# Patient Record
Sex: MALE | Race: WHITE | NOT HISPANIC OR LATINO | Employment: OTHER | URBAN - METROPOLITAN AREA
[De-identification: names, ages, dates, MRNs, and addresses within clinical notes are randomized per-mention and may not be internally consistent; named-entity substitution may affect disease eponyms.]

---

## 2017-01-20 ENCOUNTER — OFFICE VISIT (OUTPATIENT)
Dept: MEDICAL GROUP | Age: 52
End: 2017-01-20
Payer: COMMERCIAL

## 2017-01-20 VITALS
DIASTOLIC BLOOD PRESSURE: 76 MMHG | SYSTOLIC BLOOD PRESSURE: 122 MMHG | TEMPERATURE: 97.5 F | HEIGHT: 72 IN | OXYGEN SATURATION: 97 % | WEIGHT: 187 LBS | BODY MASS INDEX: 25.33 KG/M2 | HEART RATE: 103 BPM | RESPIRATION RATE: 16 BRPM

## 2017-01-20 DIAGNOSIS — R20.0 NUMBNESS AND TINGLING OF LEFT LEG: ICD-10-CM

## 2017-01-20 DIAGNOSIS — F41.9 ANXIETY: ICD-10-CM

## 2017-01-20 DIAGNOSIS — R73.01 IMPAIRED FASTING GLUCOSE: ICD-10-CM

## 2017-01-20 DIAGNOSIS — E78.2 MIXED HYPERLIPIDEMIA: ICD-10-CM

## 2017-01-20 DIAGNOSIS — F32.A ANXIETY AND DEPRESSION: ICD-10-CM

## 2017-01-20 DIAGNOSIS — F32.9 REACTIVE DEPRESSION (SITUATIONAL): ICD-10-CM

## 2017-01-20 DIAGNOSIS — M54.42 ACUTE MIDLINE LOW BACK PAIN WITH LEFT-SIDED SCIATICA: ICD-10-CM

## 2017-01-20 DIAGNOSIS — F41.9 ANXIETY AND DEPRESSION: ICD-10-CM

## 2017-01-20 DIAGNOSIS — R20.2 NUMBNESS AND TINGLING OF LEFT LEG: ICD-10-CM

## 2017-01-20 DIAGNOSIS — F51.01 PRIMARY INSOMNIA: ICD-10-CM

## 2017-01-20 DIAGNOSIS — R73.01 IFG (IMPAIRED FASTING GLUCOSE): ICD-10-CM

## 2017-01-20 PROCEDURE — 99214 OFFICE O/P EST MOD 30 MIN: CPT | Performed by: INTERNAL MEDICINE

## 2017-01-20 RX ORDER — IBUPROFEN 800 MG/1
800 TABLET ORAL EVERY 8 HOURS PRN
COMMUNITY
End: 2017-01-20 | Stop reason: SDUPTHER

## 2017-01-20 RX ORDER — ALPRAZOLAM 0.25 MG/1
0.25 TABLET ORAL 2 TIMES DAILY PRN
Qty: 60 TAB | Refills: 5 | Status: SHIPPED | OUTPATIENT
Start: 2017-01-20 | End: 2017-08-31 | Stop reason: SDUPTHER

## 2017-01-20 RX ORDER — METHYLPREDNISOLONE 4 MG/1
TABLET ORAL
Qty: 1 KIT | Refills: 1 | Status: SHIPPED | OUTPATIENT
Start: 2017-01-20 | End: 2017-04-21

## 2017-01-20 RX ORDER — IBUPROFEN 800 MG/1
800 TABLET ORAL EVERY 8 HOURS PRN
Qty: 90 TAB | Refills: 1 | Status: SHIPPED | OUTPATIENT
Start: 2017-01-26 | End: 2019-03-29

## 2017-01-20 ASSESSMENT — ENCOUNTER SYMPTOMS
NUMBNESS: 1
EYES NEGATIVE: 1
GASTROINTESTINAL NEGATIVE: 1
WEAKNESS: 0
BOWEL INCONTINENCE: 0
CONSTITUTIONAL NEGATIVE: 1
WEIGHT LOSS: 0
HEADACHES: 0
PERIANAL NUMBNESS: 0
PSYCHIATRIC NEGATIVE: 1
FEVER: 0
CARDIOVASCULAR NEGATIVE: 1
PARESIS: 0
LEG PAIN: 1
TINGLING: 1
BACK PAIN: 1
ABDOMINAL PAIN: 0
RESPIRATORY NEGATIVE: 1
PARESTHESIAS: 1

## 2017-01-20 NOTE — MR AVS SNAPSHOT
"        Joseph Gray   2017 11:20 AM   Office Visit   MRN: [id number]    Department:  [address] [place] [address]   Dept Phone:  [phone]    Description:  Male : 1965   Provider:  Jose Luis Mace M.D.           Reason for Visit     Back Pain UC f/v [facility] poss displaced disk in lower back, difficulty walking      Allergies as of 2017     No Known Allergies      You were diagnosed with     Acute midline low back pain with left-sided sciatica   [7679873]       Numbness and tingling of left leg   [847534]       Mixed hyperlipidemia   [272.2.ICD-9-CM]       Impaired fasting glucose   [790.21.ICD-9-CM]       Anxiety   [321148]       Primary insomnia   [724655]       Reactive depression (situational)   [083902]       Anxiety and depression   [091562]   Chronic and well controlled with Xanax. Refill done. Follow-up with PCP.    IFG (impaired fasting glucose)   [350839]         Vital Signs     Blood Pressure Pulse Temperature Respirations Height Weight    122/76 mmHg 103 36.4 °C (97.5 °F) 16 1.829 m (6' 0.01\") 84.823 kg (187 lb)    Body Mass Index Oxygen Saturation Smoking Status             25.36 kg/m2 97% Never Smoker          Basic Information     Date Of Birth Sex Race Ethnicity Preferred Language    1965 Male White Non- English      Your appointments     2017  2:40 PM   ANNUAL EXAM PREVENTATIVE with Jose Luis Mace M.D.   [address] [facility]     [place] [id number] [phone]            2017 10:00 AM   ANNUAL EXAM PREVENTATIVE with Jose Luis Mace M.D.   [facility] [address] [employer] [facility])    [address] [place] [id number] [phone]              Problem List              ICD-10-CM Priority Class Noted - Resolved    Anxiety F41.9   2013 - Present    Impaired fasting glucose R73.01   2013 - Present    Vitamin D deficiency disease E55.9   2013 - Present    HLD " (hyperlipidemia)- no meds E78.5   12/4/2013 - Present    Depression- grief reacion; lost wife 2013, prolonged F32.9   12/4/2013 - Present    Eczema L30.9   3/19/2015 - Present    Intracranial hypotension- idiopathic, intermittent, orthostatic; no rx except bedrest; no blood patch ever done G93.89   3/19/2015 - Present    Sebaceous cyst of right axilla L72.3   2/10/2016 - Present    Reactive depression (situational)- prolonged, lost wife 2013 F32.9   1/20/2017 - Present    Primary insomnia F51.01   1/20/2017 - Present    IFG (impaired fasting glucose) R73.01   1/20/2017 - Present      Health Maintenance        Date Due Completion Dates    IMM INFLUENZA (1) 9/1/2016 2/10/2016, 12/4/2013    COLONOSCOPY 3/13/2019 3/13/2014    IMM DTaP/Tdap/Td Vaccine (2 - Td) 5/2/2025 5/2/2015            Current Immunizations     Influenza TIV (IM) 12/4/2013    Influenza Vaccine Quad Inj (Preserved) 2/10/2016    Tdap Vaccine 5/2/2015  4:40 PM      Below and/or attached are the medications your provider expects you to take. Review all of your home medications and newly ordered medications with your provider and/or pharmacist. Follow medication instructions as directed by your provider and/or pharmacist. Please keep your medication list with you and share with your provider. Update the information when medications are discontinued, doses are changed, or new medications (including over-the-counter products) are added; and carry medication information at all times in the event of emergency situations     Allergies:  No Known Allergies          Medications  Valid as of: January 20, 2017 - 12:00 PM    Generic Name Brand Name Tablet Size Instructions for use    ALPRAZolam (Tab) XANAX 0.25 MG Take 1 Tab by mouth 2 times a day as needed for Sleep. Or for anxiety        Ibuprofen (Tab) MOTRIN 800 MG Take 1 Tab by mouth every 8 hours as needed.        MethylPREDNISolone (Tablet Therapy Pack) MEDROL DOSEPAK 4 MG As directed        .                    Medicines prescribed today were sent to:     [facility]AC[facility] #[id number] - [place], [place] - [address]    [address] [place] [address] [place] [address]    Phone: [phone] Fax: [phone]    Open 24 Hours?: No      Medication refill instructions:       If your prescription bottle indicates you have medication refills left, it is not necessary to call your provider’s office. Please contact your pharmacy and they will refill your medication.    If your prescription bottle indicates you do not have any refills left, you may request refills at any time through one of the following ways: The online [employer] system (except Urgent Care), by calling your provider’s office, or by asking your pharmacy to contact your provider’s office with a refill request. Medication refills are processed only during regular business hours and may not be available until the next business day. Your provider may request additional information or to have a follow-up visit with you prior to refilling your medication.   *Please Note: Medication refills are assigned a new Rx number when refilled electronically. Your pharmacy may indicate that no refills were authorized even though a new prescription for the same medication is available at the pharmacy. Please request the medicine by name with the pharmacy before contacting your provider for a refill.        Your To Do List     Future Labs/Procedures Complete By Expires    CBC WITH DIFFERENTIAL  As directed 1/20/2018    COMP METABOLIC PANEL  As directed 1/20/2018    HEMOGLOBIN A1C  As directed 1/20/2018    LIPID PROFILE  As directed 1/20/2018    MR-LUMBAR SPINE-W/O  As directed 1/20/2018    TSH  As directed 1/20/2018      Referral     A referral request has been sent to our patient care coordination department. Please allow 3-5 business days for us to process this request and contact you either by phone or mail. If you do not hear from us by the 5th business day, please call us at [phone].            [employer] Access Code: Activation code not generated  Current [employer] Status: Active

## 2017-01-20 NOTE — PROGRESS NOTES
Subjective:      Joseph Gray is a 51 y.o. male who presents with Back Pain            Back Pain  This is a recurrent problem. The current episode started in the past 7 days. The problem occurs constantly. The problem is unchanged. The pain is present in the sacro-iliac, lumbar spine and gluteal. The quality of the pain is described as aching, burning, stabbing and shooting. The pain radiates to the left foot, left thigh and left knee. The pain is at a severity of 8/10. The pain is severe. The pain is the same all the time. The symptoms are aggravated by bending, coughing, sitting and twisting. Stiffness is present all day. Associated symptoms include leg pain, numbness, paresthesias and tingling. Pertinent negatives include no abdominal pain, bladder incontinence, bowel incontinence, chest pain, dysuria, fever, headaches, paresis, pelvic pain, perianal numbness, weakness or weight loss. He has tried ice, heat, NSAIDs, analgesics and bed rest for the symptoms. The treatment provided mild relief.     Patient seen 5 days ago at [facility] urgent care where he was given shot of Toradol and Accutane medication. Also muscle relaxant. He's been taking 400 mg over-the-counter 3 times a day when necessary since finishing his mitral dose pack. Also Xanax for muscle spasms. There's been minimal improvement. His chief complaint is this severe. Numbness in his left leg without significant weakness or pain, as well as persistent low back pain..         The patient is here for followup of chronic medical problems listed below. The patient is compliant with medications and having no side effects from them. Denies chest pain, abdominal pain, dyspnea, myalgias, or cough.     These include his dyslipidemia on diet and exercise therapy, vitamin D deficiency, prediabetes, and reactive depression since the loss of his wife 3 years ago. Did not tolerate antidepressants very well but does occasionally require Xanax for sleep or  "anxiety. No suicidal ideation or extreme mood swings but does feel blue frequently.      Review of Systems   Constitutional: Negative.  Negative for fever and weight loss.   HENT: Negative.    Eyes: Negative.    Respiratory: Negative.    Cardiovascular: Negative.  Negative for chest pain.   Gastrointestinal: Negative.  Negative for abdominal pain and bowel incontinence.   Genitourinary: Negative.  Negative for bladder incontinence, dysuria and pelvic pain.   Musculoskeletal: Positive for back pain.   Skin: Negative.    Neurological: Positive for tingling, numbness and paresthesias. Negative for weakness and headaches.   Endo/Heme/Allergies: Negative.    Psychiatric/Behavioral: Negative.           Objective:     /76 mmHg  Pulse 103  Temp(Src) 36.4 °C (97.5 °F)  Resp 16  Ht 1.829 m (6' 0.01\")  Wt 84.823 kg (187 lb)  BMI 25.36 kg/m2  SpO2 97%     Physical Exam   Constitutional: He is oriented to person, place, and time. He appears well-developed and well-nourished. No distress.   HENT:   Head: Normocephalic and atraumatic.   Right Ear: External ear normal.   Left Ear: External ear normal.   Mouth/Throat: Oropharynx is clear and moist. No oropharyngeal exudate.   Eyes: Conjunctivae and EOM are normal. Pupils are equal, round, and reactive to light. Right eye exhibits no discharge.   Neck: Normal range of motion. Neck supple. No JVD present. No tracheal deviation present. No thyromegaly present.   Cardiovascular: Normal rate, regular rhythm, normal heart sounds and intact distal pulses.  Exam reveals no gallop.    Pulmonary/Chest: Effort normal and breath sounds normal. No respiratory distress. He has no wheezes. He has no rales. He exhibits no tenderness.   Abdominal: Soft. Bowel sounds are normal. He exhibits no distension and no mass. There is no tenderness. There is no rebound and no guarding. No hernia.   Genitourinary: Guaiac negative stool. No penile tenderness.   Musculoskeletal: He exhibits " tenderness. He exhibits no edema.   There is tenderness in the left lower lumbar sacral area. There is positive straight leg raising at 45° on the left reproducing his pain shooting down his leg. There is mild hyperesthesia and sensory loss in the left lower lateral leg. The DTRs are diminished on the left side of the ankle and knee. Strength however is preserved.   Lymphadenopathy:     He has no cervical adenopathy.   Neurological: He is alert and oriented to person, place, and time. He has normal reflexes. He displays normal reflexes. No cranial nerve deficit. He exhibits normal muscle tone. Coordination normal.   Skin: Skin is warm and dry. No rash noted. He is not diaphoretic. No erythema. No pallor.   Psychiatric: He has a normal mood and affect. His behavior is normal. Judgment and thought content normal.   Nursing note and vitals reviewed.    No visits with results within 1 Month(s) from this visit.  Latest known visit with results is:    Hospital Outpatient Visit on 04/18/2014   Component Date Value   • Cholesterol,Tot 04/18/2014 202*   • Triglycerides 04/18/2014 59    • HDL 04/18/2014 61    • LDL 04/18/2014 129*   • Sodium 04/18/2014 140    • Potassium 04/18/2014 3.9    • Chloride 04/18/2014 104    • Co2 04/18/2014 25    • Anion Gap 04/18/2014 11.0    • Glucose 04/18/2014 111*   • Bun 04/18/2014 14    • Creatinine 04/18/2014 1.23    • Calcium 04/18/2014 9.5    • AST(SGOT) 04/18/2014 18    • ALT(SGPT) 04/18/2014 18    • Alkaline Phosphatase 04/18/2014 48    • Total Bilirubin 04/18/2014 0.9    • Albumin 04/18/2014 4.5    • Total Protein 04/18/2014 7.4    • Globulin 04/18/2014 2.9    • A-G Ratio 04/18/2014 1.6    • 25-Hydroxy   Vitamin D 25 04/18/2014 33    • Glycohemoglobin 04/18/2014 5.7*   • Est Avg Glucose 04/18/2014 117    • GFR If  04/18/2014 >60    • GFR If Non  [employer]* 04/18/2014 >60       Lab Results   Component Value Date/Time    GLYCOHEMOGLOBIN 5.7* 04/18/2014 09:01 AM     Lab  Results   Component Value Date/Time    SODIUM 140 04/18/2014 09:01 AM    POTASSIUM 3.9 04/18/2014 09:01 AM    CHLORIDE 104 04/18/2014 09:01 AM    CO2 25 04/18/2014 09:01 AM    GLUCOSE 111* 04/18/2014 09:01 AM    BUN 14 04/18/2014 09:01 AM    CREATININE 1.23 04/18/2014 09:01 AM    ALKALINE PHOSPHATASE 48 04/18/2014 09:01 AM    AST(SGOT) 18 04/18/2014 09:01 AM    ALT(SGPT) 18 04/18/2014 09:01 AM    TOTAL BILIRUBIN 0.9 04/18/2014 09:01 AM     No results found for: INR  Lab Results   Component Value Date/Time    CHOLESTEROL,* 04/18/2014 09:01 AM    * 04/18/2014 09:01 AM    HDL 61 04/18/2014 09:01 AM    TRIGLYCERIDES 59 04/18/2014 09:01 AM       No results found for: TESTOSTERONE  No results found for: TSH  Lab Results   Component Value Date/Time    FREE T-4 0.93 12/03/2013 08:18 AM     No results found for: URICACID  No components found for: VITB12  Lab Results   Component Value Date/Time    25-HYDROXY   VITAMIN D 25 33 04/18/2014 09:01 AM    25-HYDROXY   VITAMIN D 25 30 12/03/2013 08:18 AM                 Assessment/Plan:     1. Acute midline low back pain with left-sided sciatica     - REFERRAL TO PHYSICAL THERAPY Reason for Therapy: Eval/Treat/Report  - MethylPREDNISolone (MEDROL DOSEPAK) 4 MG Tablet Therapy Pack; As directed  Dispense: 1 Kit; Refill: 1  - MR-LUMBAR SPINE-W/O; Future  - ibuprofen (MOTRIN) 800 MG Tab; Take 1 Tab by mouth every 8 hours as needed.  Dispense: 90 Tab; Refill: 1    2. Numbness and tingling of left leg-because of patient does have some neurological findings with diminished reflexes and sensory loss will refer for MRI to look for possible nerve root compression that might be amenable to surgical approach.     - MR-LUMBAR SPINE-W/O; Future    3. Mixed hyperlipidemiaUnder good control. Continue same regimen.     - TSH; Future  - COMP METABOLIC PANEL; Future  - LIPID PROFILE; Future  - CBC WITH DIFFERENTIAL; Future            5. Anxiety-not well controlled. Needs referral to  behavior health for further management.       6. Primary insomnia     - alprazolam (XANAX) 0.25 MG Tab; Take 1 Tab by mouth 2 times a day as needed for Sleep. Or for anxiety  Dispense: 60 Tab; Refill: 5    7. Reactive depression (situational)- prolonged, lost wife 2013-not well controlled. Needs referral to behavior health for further management.     - REFERRAL TO BEHAVIORAL HEALTH      8. Anxiety and depressionnot well controlled. Needs referral to behavior health for further management.      - alprazolam (XANAX) 0.25 MG Tab; Take 1 Tab by mouth 2 times a day as needed for Sleep. Or for anxiety  Dispense: 60 Tab; Refill: 5    9. IFG (impaired fasting glucose)-     is borderline.  The Mediterranean diet. Recheck lab with A1c  - HEMOGLOBIN A1C; Future      30 minute face-to-face encounter took place today.  More than half of this time was spent in the coordination of care of the above problems, as well as counseling.

## 2017-02-02 ENCOUNTER — APPOINTMENT (OUTPATIENT)
Dept: MEDICAL GROUP | Age: 52
End: 2017-02-02
Payer: COMMERCIAL

## 2017-02-02 ENCOUNTER — HOSPITAL ENCOUNTER (OUTPATIENT)
Dept: RADIOLOGY | Facility: MEDICAL CENTER | Age: 52
End: 2017-02-02
Attending: INTERNAL MEDICINE
Payer: COMMERCIAL

## 2017-02-02 DIAGNOSIS — R20.0 NUMBNESS AND TINGLING OF LEFT LEG: ICD-10-CM

## 2017-02-02 DIAGNOSIS — R20.2 NUMBNESS AND TINGLING OF LEFT LEG: ICD-10-CM

## 2017-02-02 DIAGNOSIS — M54.42 ACUTE MIDLINE LOW BACK PAIN WITH LEFT-SIDED SCIATICA: ICD-10-CM

## 2017-02-02 PROCEDURE — 72148 MRI LUMBAR SPINE W/O DYE: CPT

## 2017-02-03 ENCOUNTER — TELEPHONE (OUTPATIENT)
Dept: MEDICAL GROUP | Age: 52
End: 2017-02-03

## 2017-02-03 DIAGNOSIS — M51.16 LUMBAR DISC HERNIATION WITH RADICULOPATHY: ICD-10-CM

## 2017-02-07 ENCOUNTER — OFFICE VISIT (OUTPATIENT)
Dept: URGENT CARE | Facility: CLINIC | Age: 52
End: 2017-02-07
Payer: COMMERCIAL

## 2017-02-07 ENCOUNTER — TELEPHONE (OUTPATIENT)
Dept: MEDICAL GROUP | Age: 52
End: 2017-02-07

## 2017-02-07 ENCOUNTER — APPOINTMENT (OUTPATIENT)
Dept: RADIOLOGY | Facility: IMAGING CENTER | Age: 52
End: 2017-02-07
Attending: PHYSICIAN ASSISTANT
Payer: COMMERCIAL

## 2017-02-07 VITALS
OXYGEN SATURATION: 96 % | WEIGHT: 180 LBS | DIASTOLIC BLOOD PRESSURE: 70 MMHG | TEMPERATURE: 97.5 F | BODY MASS INDEX: 24.41 KG/M2 | HEART RATE: 78 BPM | SYSTOLIC BLOOD PRESSURE: 110 MMHG | RESPIRATION RATE: 16 BRPM

## 2017-02-07 DIAGNOSIS — M20.002 FINGER DEFORMITY, LEFT: ICD-10-CM

## 2017-02-07 DIAGNOSIS — S69.92XA INJURY OF COLLATERAL LIGAMENT OF FINGER OF LEFT HAND, INITIAL ENCOUNTER: Primary | ICD-10-CM
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
## 2017-02-07 PROCEDURE — 73140 X-RAY EXAM OF FINGER(S): CPT | Mod: TC,LT | Performed by: PHYSICIAN ASSISTANT

## 2017-02-07 PROCEDURE — 99213 OFFICE O/P EST LOW 20 MIN: CPT | Performed by: PHYSICIAN ASSISTANT

## 2017-02-07 NOTE — MR AVS SNAPSHOT
Joseph Gray   2017 8:15 AM   Office Visit   MRN: [id number]    Department:  [facility]   Dept Phone:  [phone]    Description:  Male : 1965   Provider:  Rey Nesbitt PA-C           Reason for Visit     Finger Injury Over 6 wks ago hurt left pinky       Allergies as of 2017     No Known Allergies      You were diagnosed with     Injury of collateral ligament of finger of left hand, initial encounter   [8411872]  -  Primary     Finger deformity, left   [193474]       Fall with injury, initial encounter   [8583751]         Vital Signs     Blood Pressure Pulse Temperature Respirations Weight Oxygen Saturation    110/70 mmHg 78 36.4 °C (97.5 °F) 16 81.647 kg (180 lb) 96%    Smoking Status                   Never Smoker            Basic Information     Date Of Birth Sex Race Ethnicity Preferred Language    1965 Male White Non- English      Your appointments     2017 10:00 AM   ANNUAL EXAM PREVENTATIVE with Jose Luis Mace M.D.   [address] [id number] [phone]              Problem List              ICD-10-CM Priority Class Noted - Resolved    Anxiety F41.9   2013 - Present    Impaired fasting glucose R73.01   2013 - Present    Vitamin D deficiency disease E55.9   2013 - Present    HLD (hyperlipidemia)- no meds E78.5   2013 - Present    Depression- grief reacion; lost wife , prolonged F32.9   2013 - Present    Eczema L30.9   3/19/2015 - Present    Intracranial hypotension- idiopathic, intermittent, orthostatic; no rx except bedrest; no blood patch ever done G93.89   3/19/2015 - Present    Sebaceous cyst of right axilla L72.3   2/10/2016 - Present    Reactive depression (situational)- prolonged, lost wife  F32.9   2017 - Present    Primary insomnia F51.01   2017 - Present    IFG (impaired fasting glucose) R73.01   2017 - Present    Lumbar  disc herniation with radiculopathy- ref neurosurgery M51.16   2/3/2017 - Present      Health Maintenance        Date Due Completion Dates    IMM INFLUENZA (1) 9/1/2016 2/10/2016, 12/4/2013    COLONOSCOPY 3/13/2019 3/13/2014    IMM DTaP/Tdap/Td Vaccine (2 - Td) 5/2/2025 5/2/2015            Current Immunizations     Influenza TIV (IM) 12/4/2013    Influenza Vaccine Quad Inj (Preserved) 2/10/2016    Tdap Vaccine 5/2/2015  4:40 PM      Below and/or attached are the medications your provider expects you to take. Review all of your home medications and newly ordered medications with your provider and/or pharmacist. Follow medication instructions as directed by your provider and/or pharmacist. Please keep your medication list with you and share with your provider. Update the information when medications are discontinued, doses are changed, or new medications (including over-the-counter products) are added; and carry medication information at all times in the event of emergency situations     Allergies:  No Known Allergies          Medications  Valid as of: February 07, 2017 -  8:37 AM    Generic Name Brand Name Tablet Size Instructions for use    ALPRAZolam (Tab) XANAX 0.25 MG Take 1 Tab by mouth 2 times a day as needed for Sleep. Or for anxiety        Ibuprofen (Tab) MOTRIN 800 MG Take 1 Tab by mouth every 8 hours as needed.        MethylPREDNISolone (Tablet Therapy Pack) MEDROL DOSEPAK 4 MG As directed        .                 Medicines prescribed today were sent to:     [facility]Y #[id number] - [place], [place] - [address]    [address] [place] [address]    Phone: [phone] Fax: [phone]    Open 24 Hours?: No      Medication refill instructions:       If your prescription bottle indicates you have medication refills left, it is not necessary to call your provider’s office. Please contact your pharmacy and they will refill your medication.    If your prescription bottle indicates you do not have any refills  left, you may request refills at any time through one of the following ways: The online [employer] system (except Urgent Care), by calling your provider’s office, or by asking your pharmacy to contact your provider’s office with a refill request. Medication refills are processed only during regular business hours and may not be available until the next business day. Your provider may request additional information or to have a follow-up visit with you prior to refilling your medication.   *Please Note: Medication refills are assigned a new Rx number when refilled electronically. Your pharmacy may indicate that no refills were authorized even though a new prescription for the same medication is available at the pharmacy. Please request the medicine by name with the pharmacy before contacting your provider for a refill.        Your To Do List     Future Labs/Procedures Complete By Expires    DX-FINGER(S) 2+ LEFT  As directed 2/7/2018      Referral     A referral request has been sent to our patient care coordination department. Please allow 3-5 business days for us to process this request and contact you either by phone or mail. If you do not hear from us by the 5th business day, please call us at [phone].        Instructions    RICE for Routine Care of Injuries  The routine care of many injuries includes rest, ice, compression, and elevation (RICE). The RICE strategy is often recommended for injuries to soft tissues, such as a muscle strain, ligament injuries, bruises, and overuse injuries. It can also be used for some bony injuries. Using the RICE strategy can help to relieve pain, lessen swelling, and enable your body to heal.  Rest  Rest is required to allow your body to heal. This usually involves reducing your normal activities and avoiding use of the injured part of your body. Generally, you can return to your normal activities when you are comfortable and have been given permission by your health care  provider.  Ice  Icing your injury helps to keep the swelling down, and it lessens pain. Do not apply ice directly to your skin.  · Put ice in a plastic bag.  · Place a towel between your skin and the bag.  · Leave the ice on for 20 minutes, 2-3 times a day.  Do this for as long as you are directed by your health care provider.  Compression  Compression means putting pressure on the injured area. Compression helps to keep swelling down, gives support, and helps with discomfort. Compression may be done with an elastic bandage. If an elastic bandage has been applied, follow these general tips:  · Remove and reapply the bandage every 3-4 hours or as directed by your health care provider.  · Make sure the bandage is not wrapped too tightly, because this can cut off circulation. If part of your body beyond the bandage becomes blue, numb, cold, swollen, or more painful, your bandage is most likely too tight. If this occurs, remove your bandage and reapply it more loosely.  · See your health care provider if the bandage seems to be making your problems worse rather than better.  Elevation  Elevation means keeping the injured area raised. This helps to lessen swelling and decrease pain. If possible, your injured area should be elevated at or above the level of your heart or the center of your chest.  WHEN SHOULD I SEEK MEDICAL CARE?  You should seek medical care if:  · Your pain and swelling continue.  · Your symptoms are getting worse rather than improving.  These symptoms may indicate that further evaluation or further X-rays are needed. Sometimes, X-rays may not show a small broken bone (fracture) until a number of days later. Make a follow-up appointment with your health care provider.  WHEN SHOULD I SEEK IMMEDIATE MEDICAL CARE?  You should seek immediate medical care if:  · You have sudden severe pain at or below the area of your injury.  · You have redness or increased swelling around your injury.  · You have tingling  or numbness at or below the area of your injury that does not improve after you remove the elastic bandage.     This information is not intended to replace advice given to you by your health care provider. Make sure you discuss any questions you have with your health care provider.     Document Released: 04/01/2002 Document Revised: 12/23/2014 Document Reviewed: 11/25/2015  [employer] Interactive Patient Education ©2016 [employer]            [employer] Access Code: Activation code not generated  Current [employer] Status: Active

## 2017-02-07 NOTE — TELEPHONE ENCOUNTER
----- Message from Jose Luis Mace M.D. sent at 2/3/2017  9:18 AM PST -----  Shows herniated disc- refer to neurosurgery- ordered

## 2017-02-07 NOTE — TELEPHONE ENCOUNTER
Phone Number Called: [phone] (home)     Message: Patient notified of results and referral. Verbalized understanding    Left Message for patient to call back: no

## 2017-02-07 NOTE — PROGRESS NOTES
"Subjective:      PT is a 51 y.o. male who presents with Finger Injury            HPI  Pt states that 2 months ago, before Ganesh, he fell in a dark room at home and injured his 5th finger on his left hand and is unsure if it dislocated but notes at the time he \"just grabbed it and pulled\". He states since then there has been a deformity and lack of flexion. Pt has not taken any Rx medications for this condition. Pt states the pain is a 1/10, aching in nature and worse at night or when catching it during a grabbing motion. Pt denies CP, SOB, NVD, paresthesias, headaches, dizziness, change in vision, hives, or other joint pain. The pt's medication list, problem list, and allergies have been evaluated and reviewed during today's visit.    PMH:  Negative per pt.      PSH:  Past Surgical History   Procedure Laterality Date   • Other       broken arm   • Rotator cuff repair     • Appendectomy     • Other       broken thumb       Fam Hx:    family history includes Cancer in his father; Hypertension in his father.    Soc HX:  Social History     Social History   • Marital Status:      Spouse Name: N/A   • Number of Children: N/A   • Years of Education: N/A     Occupational History   • Not on file.     Social History Main Topics   • Smoking status: Never Smoker    • Smokeless tobacco: Never Used   • Alcohol Use: 0.0 oz/week     0 Standard drinks or equivalent per week      Comment: 1-3 drinks nightly   • Drug Use: No   • Sexual Activity: Not on file     Other Topics Concern   • Not on file     Social History Narrative         Medications:    Current outpatient prescriptions:   •  ibuprofen (MOTRIN) 800 MG Tab, Take 1 Tab by mouth every 8 hours as needed., Disp: 90 Tab, Rfl: 1  •  MethylPREDNISolone (MEDROL DOSEPAK) 4 MG Tablet Therapy Pack, As directed, Disp: 1 Kit, Rfl: 1  •  alprazolam (XANAX) 0.25 MG Tab, Take 1 Tab by mouth 2 times a day as needed for Sleep. Or for anxiety, Disp: 60 Tab, Rfl: " 5      Allergies:  Review of patient's allergies indicates no known allergies.      ROS  Constitutional: Negative for fever, chills and malaise/fatigue.   HENT: Negative for congestion and sore throat.    Eyes: Negative for blurred vision, double vision and photophobia.   Respiratory: Negative for cough and shortness of breath.    Cardiovascular: Negative for chest pain and palpitations.   Gastrointestinal: Negative for heartburn, nausea, vomiting, abdominal pain, diarrhea and constipation.   Genitourinary: Negative for dysuria and flank pain.   Musculoskeletal: POS for left 5th finger deformity and lack of ROM  Skin: Negative for itching and rash.   Neurological: Negative for dizziness, tingling and headaches.   Endo/Heme/Allergies: Does not bruise/bleed easily.   Psychiatric/Behavioral: Negative for depression. The patient is not nervous/anxious.           Objective:     /70 mmHg  Pulse 78  Temp(Src) 36.4 °C (97.5 °F)  Resp 16  Wt 81.647 kg (180 lb)  SpO2 96%     Physical Exam   Musculoskeletal:        Left hand: He exhibits decreased range of motion and deformity. He exhibits no tenderness, no bony tenderness, normal two-point discrimination, normal capillary refill, no laceration and no swelling. Normal sensation noted. Normal strength noted.        Hands:        Constitutional: PT is oriented to person, place, and time. PT appears well-developed and well-nourished. No distress.   HENT:   Head: Normocephalic and atraumatic.   Mouth/Throat: Oropharynx is clear and moist. No oropharyngeal exudate.   Eyes: Conjunctivae normal and EOM are normal. Pupils are equal, round, and reactive to light.   Neck: Normal range of motion. Neck supple. No thyromegaly present.   Cardiovascular: Normal rate, regular rhythm, normal heart sounds and intact distal pulses.  Exam reveals no gallop and no friction rub.    No murmur heard.  Pulmonary/Chest: Effort normal and breath sounds normal. No respiratory distress. PT has  no wheezes. PT has no rales. Pt exhibits no tenderness.   Abdominal: Soft. Bowel sounds are normal. PT exhibits no distension and no mass. There is no tenderness. There is no rebound and no guarding.   Neurological: PT is alert and oriented to person, place, and time. PT has normal reflexes. No cranial nerve deficit.   Skin: Skin is warm and dry. No rash noted. PT is not diaphoretic. No erythema.       Psychiatric: PT has a normal mood and affect. PT behavior is normal. Judgment and thought content normal.        RADS:  Narrative        2/7/2017 8:19 AM    HISTORY/REASON FOR EXAM:  Pain/Deformity Following Trauma.  Left finger injury    TECHNIQUE/EXAM DESCRIPTION AND NUMBER OF VIEWS:  3 views of the LEFT fingers.    COMPARISON: 5/31/2014    FINDINGS:  No acute fracture or dislocation is seen. Metallic foreign body again overlies the base of the proximal phalanx of the first digit. No osseous erosion is seen. No substantial soft tissue swelling is noted.       Impression        No evidence of acute fracture or dislocation.    Metallic foreign body again overlies the base of the proximal phalanx of the first digit.            Reading Provider Reading Date     Shameka Mora M.D. Feb 7, 2017            Signing Provider Signing Date Signing Time     Shameka Mora M.D. Feb 7, 2017  8:32 AM           Assessment/Plan:     1. Injury of collateral ligament of finger of left hand, initial encounter- 5th PIP medial    - REFERRAL TO HAND SURGERY    2. Finger deformity, left    - DX-FINGER(S) 2+ LEFT; Future  - REFERRAL TO HAND SURGERY    3. Fall with injury, initial encounter    - DX-FINGER(S) 2+ LEFT; Future  - REFERRAL TO HAND SURGERY    RICE therapy discussed  Gentle ROM exercises discussed  WBAT left hand  PT states he is already seeing PT for his back and with request exercise for his hand  Ice/heat therapy discussed  NSAIDs for pain control  Rest, fluids encouraged.  AVS with medical info given.  Pt was in full  understanding and agreement with the plan.  Follow-up as needed if symptoms worsen or fail to improve.

## 2017-02-07 NOTE — PATIENT INSTRUCTIONS
RICE for Routine Care of Injuries  The routine care of many injuries includes rest, ice, compression, and elevation (RICE). The RICE strategy is often recommended for injuries to soft tissues, such as a muscle strain, ligament injuries, bruises, and overuse injuries. It can also be used for some bony injuries. Using the RICE strategy can help to relieve pain, lessen swelling, and enable your body to heal.  Rest  Rest is required to allow your body to heal. This usually involves reducing your normal activities and avoiding use of the injured part of your body. Generally, you can return to your normal activities when you are comfortable and have been given permission by your health care provider.  Ice  Icing your injury helps to keep the swelling down, and it lessens pain. Do not apply ice directly to your skin.  · Put ice in a plastic bag.  · Place a towel between your skin and the bag.  · Leave the ice on for 20 minutes, 2-3 times a day.  Do this for as long as you are directed by your health care provider.  Compression  Compression means putting pressure on the injured area. Compression helps to keep swelling down, gives support, and helps with discomfort. Compression may be done with an elastic bandage. If an elastic bandage has been applied, follow these general tips:  · Remove and reapply the bandage every 3-4 hours or as directed by your health care provider.  · Make sure the bandage is not wrapped too tightly, because this can cut off circulation. If part of your body beyond the bandage becomes blue, numb, cold, swollen, or more painful, your bandage is most likely too tight. If this occurs, remove your bandage and reapply it more loosely.  · See your health care provider if the bandage seems to be making your problems worse rather than better.  Elevation  Elevation means keeping the injured area raised. This helps to lessen swelling and decrease pain. If possible, your injured area should be elevated at or  above the level of your heart or the center of your chest.  WHEN SHOULD I SEEK MEDICAL CARE?  You should seek medical care if:  · Your pain and swelling continue.  · Your symptoms are getting worse rather than improving.  These symptoms may indicate that further evaluation or further X-rays are needed. Sometimes, X-rays may not show a small broken bone (fracture) until a number of days later. Make a follow-up appointment with your health care provider.  WHEN SHOULD I SEEK IMMEDIATE MEDICAL CARE?  You should seek immediate medical care if:  · You have sudden severe pain at or below the area of your injury.  · You have redness or increased swelling around your injury.  · You have tingling or numbness at or below the area of your injury that does not improve after you remove the elastic bandage.     This information is not intended to replace advice given to you by your health care provider. Make sure you discuss any questions you have with your health care provider.     Document Released: 04/01/2002 Document Revised: 12/23/2014 Document Reviewed: 11/25/2015  [employer] Interactive Patient Education ©2016 [employer]

## 2017-04-11 LAB — HBA1C MFR BLD: 5.8 % (ref ?–5.8)

## 2017-04-21 ENCOUNTER — OFFICE VISIT (OUTPATIENT)
Dept: MEDICAL GROUP | Age: 52
End: 2017-04-21
Payer: COMMERCIAL

## 2017-04-21 VITALS
WEIGHT: 182 LBS | TEMPERATURE: 97.3 F | OXYGEN SATURATION: 96 % | BODY MASS INDEX: 25.48 KG/M2 | DIASTOLIC BLOOD PRESSURE: 82 MMHG | HEIGHT: 71 IN | SYSTOLIC BLOOD PRESSURE: 117 MMHG | HEART RATE: 77 BPM

## 2017-04-21 DIAGNOSIS — F41.9 ANXIETY: ICD-10-CM

## 2017-04-21 DIAGNOSIS — E78.2 MIXED HYPERLIPIDEMIA: ICD-10-CM

## 2017-04-21 DIAGNOSIS — F51.01 PRIMARY INSOMNIA: ICD-10-CM

## 2017-04-21 DIAGNOSIS — R73.01 IFG (IMPAIRED FASTING GLUCOSE): ICD-10-CM

## 2017-04-21 DIAGNOSIS — Z12.5 SCREENING FOR PROSTATE CANCER: ICD-10-CM

## 2017-04-21 PROCEDURE — 99396 PREV VISIT EST AGE 40-64: CPT | Performed by: INTERNAL MEDICINE

## 2017-04-21 ASSESSMENT — PATIENT HEALTH QUESTIONNAIRE - PHQ9: CLINICAL INTERPRETATION OF PHQ2 SCORE: 0

## 2017-04-21 NOTE — PROGRESS NOTES
Chief Complaint:     Joseph Gray is a 51 y.o. male who presents for a general exam    Last colonoscopy:  Last Td:    Hx STDs: no  Regular exercise: yes     He  has no past medical history on file.  He  has past surgical history that includes other; rotator cuff repair; appendectomy; and other.  Family History   Problem Relation Age of Onset   • Hypertension Father    • Cancer Father      Social History   Substance Use Topics   • Smoking status: Never Smoker    • Smokeless tobacco: Never Used   • Alcohol Use: 0.0 oz/week     0 Standard drinks or equivalent per week      Comment: 1-3 drinks nightly       Current Outpatient Prescriptions   Medication Sig Dispense Refill   • alprazolam (XANAX) 0.25 MG Tab Take 1 Tab by mouth 2 times a day as needed for Sleep. Or for anxiety 60 Tab 5   • ibuprofen (MOTRIN) 800 MG Tab Take 1 Tab by mouth every 8 hours as needed. 90 Tab 1     No current facility-administered medications for this visit.    (including changes today)  Allergies: Review of patient's allergies indicates no known allergies.    Review Of Systems  Denies fever, chills, or sweats  Skin: negative for rash, scaling, itching, pigmentation, hair or nail changes.  Eyes: negative for visual blurring, double vision, eye pain, floaters and discharge from eyes  Ears/Nose/Throat: negative for tinnitus, vertigo, bleeding gums, dental problem and hoarseness, frequent URI's, sinus trouble, persistent sore throat  Respiratory: negative for persistent cough, hemoptysis, dyspnea, recurrent pneumonia or bronchitis, asthma and wheezing  Cardiovascular: negative for palpitations, tachycardia, irregular heart beat, chest pain, or peripheral edema.  Gastrointestinal: negative for poor appetite, dysphagia, nausea, heartburn or reflux, abdominal pain, hemorrhoids, constipation or diarrhea  Genitourinary: negative for nocturia, dysuria, frequency, hesitancy, incontinence, sexually transmitted disease, abnormal penile discharge, or  "ED.  Musculoskeletal: negative for joint swelling and muscle pain/ soreness  Neurologic: negative for migraine headaches, involuntary movements or tremor  Psychiatric: negative for excessive alcohol consumption or illegal drug usage, sleep disturbance, anxiety, depression, sexual difficulties  Hematologic/Lymphatic/Immunologic: negative for anemia, unusual bruising, swollen glands, HIV risk factors  Endocrine: negative for temperature intolerance, polydipsia, polyuria, unintentional weight changes.       PHYSICAL EXAMINATION:  Blood pressure 117/82, pulse 77, temperature 36.3 °C (97.3 °F), height 1.816 m (5' 11.5\"), weight 82.555 kg (182 lb), SpO2 96 %.  Body mass index is 25.03 kg/(m^2).  Wt Readings from Last 4 Encounters:   04/21/17 82.555 kg (182 lb)   02/07/17 81.647 kg (180 lb)   01/20/17 84.823 kg (187 lb)   12/29/16 88 kg (194 lb 0.1 oz)       General appearance:healthy, well developed, well nourished, well-groomed  Psych: alert, no distress, cooperative. Eye contact good, speech goal directed. Affect calm.   Eyes: conjunctivae and sclerae normal, lids and lashes normal, EOMI, PERRLA  ENT: Ears: external ears normal to inspection, canals clear, TMs pearly gray with normal light reflex and anatomic landmarks, Nose/Sinuses: nose shows no deformity, asymmetry, or inflammation, nasal mucosa normal, no sinus tenderness, Oropharynx: lips normal without lesions, buccal mucosa normal, gums healthy, teeth intact, non-carious, palate normal, tongue midline and normal  Neck: no asymmetry, masses, adenopathy. Thyroid normal to palpation, carotids without bruits, no jugular venous distention  Lungs: clear to auscultation with good excursion, Normal respiratory rate. Chest symmetric no chest wall tenderness.  Cardiovascular: Regular rate and rhythm, no murmur.  No peripheral edema  Abdomen: No CVAT. Abdomen soft, non-tender, no masses palpable. No HSM or palpable renal abnormalities. Bowel sounds normal, no bruits " heard.  Musculoskeletal: no evidence of joint effusion, ROM of all joints is normal, no crepitation detected, strength grossly normal UE and LE, proximal and distal motor groups. No deformities present  Lymphatic: None significantly enlarged supraclavicular, axillary, or inguinal  Skin: color normal, vascularity normal, no rashes or suspicious lesions, no evidence of bleeding or bruising  Neuro: speech normal, mental status intact, gait grossly normal, muscle tone normal.  Genitalia: no hernia detected  Rectal: deferred  Prostate: Deferred    ASSESSMENT/PLAN:  1. IFG (impaired fasting glucose) Under good control. Continue same regimen.    Mediterranean diet    2. Mixed hyperlipidemia -mild no meds. Continue diet and exercise.  COMP METABOLIC PANEL    LIPID PROFILE    CBC WITH DIFFERENTIAL   3. Primary insomnia Under good control. Continue same regimen. Xanax when necessary Xanax.      4. .anxiety--Under good control. Continue same regimen. Xanax when necessary       5. Screening for prostate cancer  PROSTATE SPECIFIC AG     Labs per orders  Counseling about diet, exercise, skin care  Vaccinations per orders  HM:    Next office visit for recheck of chronic medical conditions is due in  1 year

## 2017-04-21 NOTE — MR AVS SNAPSHOT
"        Joseph Strickland Isaac   2017 10:00 AM   Office Visit   MRN: [id number]    Department:  [address] [place] [address]   Dept Phone:  [phone]    Description:  Male : 1965   Provider:  Jose Luis Mace M.D.           Reason for Visit     Annual Exam Hyperlipidemia Lab Review      Allergies as of 2017     No Known Allergies      You were diagnosed with     IFG (impaired fasting glucose)   [961839]       Mixed hyperlipidemia   [272.2.ICD-9-CM]       Primary insomnia   [608424]       Anxiety   [668298]       Screening for prostate cancer   [542719]         Vital Signs     Blood Pressure Pulse Temperature Height Weight Body Mass Index    117/82 mmHg 77 36.3 °C (97.3 °F) 1.816 m (5' 11.5\") 82.555 kg (182 lb) 25.03 kg/m2    Oxygen Saturation Smoking Status                96% Never Smoker           Basic Information     Date Of Birth Sex Race Ethnicity Preferred Language    1965 Male White Non- English      Problem List              ICD-10-CM Priority Class Noted - Resolved    Anxiety F41.9   2013 - Present    Vitamin D deficiency disease E55.9   2013 - Present    HLD (hyperlipidemia)- no meds E78.5   2013 - Present    Eczema L30.9   3/19/2015 - Present    Intracranial hypotension- idiopathic, intermittent, orthostatic; no rx except bedrest; no blood patch ever done G93.89   3/19/2015 - Present    Sebaceous cyst of right axilla L72.3   2/10/2016 - Present    Reactive depression (situational)- prolonged, lost wife  F32.9   2017 - Present    Primary insomnia F51.01   2017 - Present    IFG (impaired fasting glucose) R73.01   2017 - Present    Lumbar disc herniation with radiculopathy- ref neurosurgery M51.16   2/3/2017 - Present      Health Maintenance        Date Due Completion Dates    COLONOSCOPY 3/13/2019 3/13/2014    IMM DTaP/Tdap/Td Vaccine (2 - Td) 2025            Current Immunizations     Influenza TIV (IM) 2013    Influenza Vaccine Quad " Inj (Preserved) 2/10/2016    Tdap Vaccine 5/2/2015  4:40 PM      Below and/or attached are the medications your provider expects you to take. Review all of your home medications and newly ordered medications with your provider and/or pharmacist. Follow medication instructions as directed by your provider and/or pharmacist. Please keep your medication list with you and share with your provider. Update the information when medications are discontinued, doses are changed, or new medications (including over-the-counter products) are added; and carry medication information at all times in the event of emergency situations     Allergies:  No Known Allergies          Medications  Valid as of: April 21, 2017 - 10:33 AM    Generic Name Brand Name Tablet Size Instructions for use    ALPRAZolam (Tab) XANAX 0.25 MG Take 1 Tab by mouth 2 times a day as needed for Sleep. Or for anxiety        Ibuprofen (Tab) MOTRIN 800 MG Take 1 Tab by mouth every 8 hours as needed.        .                 Medicines prescribed today were sent to:     [id number] - [place], [place] - [address]    [address] [place] [address]    Phone: [phone] Fax: [phone]    Open 24 Hours?: No      Medication refill instructions:       If your prescription bottle indicates you have medication refills left, it is not necessary to call your provider’s office. Please contact your pharmacy and they will refill your medication.    If your prescription bottle indicates you do not have any refills left, you may request refills at any time through one of the following ways: The online [employer] system (except Urgent Care), by calling your provider’s office, or by asking your pharmacy to contact your provider’s office with a refill request. Medication refills are processed only during regular business hours and may not be available until the next business day. Your provider may request additional information or to have a follow-up visit with you  prior to refilling your medication.   *Please Note: Medication refills are assigned a new Rx number when refilled electronically. Your pharmacy may indicate that no refills were authorized even though a new prescription for the same medication is available at the pharmacy. Please request the medicine by name with the pharmacy before contacting your provider for a refill.        Your To Do List     Future Labs/Procedures Complete By Expires    CBC WITH DIFFERENTIAL  As directed 4/21/2018    COMP METABOLIC PANEL  As directed 4/21/2018    LIPID PROFILE  As directed 4/21/2018         [employer] Access Code: Activation code not generated  Current Portr Status: Active

## 2017-05-11 ENCOUNTER — PATIENT MESSAGE (OUTPATIENT)
Dept: MEDICAL GROUP | Age: 52
End: 2017-05-11

## 2017-05-11 DIAGNOSIS — M54.9 BACK PAIN, UNSPECIFIED BACK LOCATION, UNSPECIFIED BACK PAIN LATERALITY, UNSPECIFIED CHRONICITY: ICD-10-CM

## 2017-05-11 DIAGNOSIS — H93.8X3 EAR FULLNESS, BILATERAL: ICD-10-CM

## 2017-08-31 DIAGNOSIS — F32.A ANXIETY AND DEPRESSION: ICD-10-CM

## 2017-08-31 DIAGNOSIS — F41.9 ANXIETY AND DEPRESSION: ICD-10-CM

## 2017-08-31 DIAGNOSIS — F51.01 PRIMARY INSOMNIA: ICD-10-CM

## 2017-09-01 RX ORDER — ALPRAZOLAM 0.25 MG/1
TABLET ORAL
Qty: 60 TAB | Refills: 0 | Status: SHIPPED
Start: 2017-09-01 | End: 2017-09-18 | Stop reason: SDUPTHER

## 2017-09-18 DIAGNOSIS — F41.9 ANXIETY AND DEPRESSION: ICD-10-CM

## 2017-09-18 DIAGNOSIS — F51.01 PRIMARY INSOMNIA: ICD-10-CM

## 2017-09-18 DIAGNOSIS — F32.A ANXIETY AND DEPRESSION: ICD-10-CM

## 2017-09-25 ENCOUNTER — PATIENT MESSAGE (OUTPATIENT)
Dept: MEDICAL GROUP | Age: 52
End: 2017-09-25

## 2017-09-25 DIAGNOSIS — L98.9 SKIN LESIONS: ICD-10-CM

## 2017-09-25 RX ORDER — ALPRAZOLAM 0.25 MG/1
TABLET ORAL
Qty: 60 TAB | Refills: 0 | Status: SHIPPED
Start: 2017-09-25 | End: 2018-05-10 | Stop reason: SDUPTHER

## 2017-09-25 NOTE — TELEPHONE ENCOUNTER
2. SPECIFIC Action To Be Taken: Referral pending, please sign.    3. Diagnosis/Clinical Reason for Request: Skin Lesions    4. Specialty & Provider Name/Lab/Imaging Location: Derm    5. Is appointment scheduled for requested order/referral: no    Patient informed they will receive a return phone call from the office ONLY if there are any questions before processing their request. Advised to call back if they haven't received a call from the referral department in 5 days.

## 2017-10-24 ENCOUNTER — APPOINTMENT (RX ONLY)
Dept: URBAN - METROPOLITAN AREA CLINIC 4 | Facility: CLINIC | Age: 52
Setting detail: DERMATOLOGY
End: 2017-10-24

## 2017-10-24 DIAGNOSIS — L81.4 OTHER MELANIN HYPERPIGMENTATION: ICD-10-CM

## 2017-10-24 DIAGNOSIS — L57.8 OTHER SKIN CHANGES DUE TO CHRONIC EXPOSURE TO NONIONIZING RADIATION: ICD-10-CM

## 2017-10-24 DIAGNOSIS — L82.0 INFLAMED SEBORRHEIC KERATOSIS: ICD-10-CM

## 2017-10-24 DIAGNOSIS — L90.5 SCAR CONDITIONS AND FIBROSIS OF SKIN: ICD-10-CM

## 2017-10-24 DIAGNOSIS — L82.1 OTHER SEBORRHEIC KERATOSIS: ICD-10-CM

## 2017-10-24 DIAGNOSIS — D18.0 HEMANGIOMA: ICD-10-CM

## 2017-10-24 DIAGNOSIS — D22 MELANOCYTIC NEVI: ICD-10-CM

## 2017-10-24 PROBLEM — D18.01 HEMANGIOMA OF SKIN AND SUBCUTANEOUS TISSUE: Status: ACTIVE | Noted: 2017-10-24

## 2017-10-24 PROBLEM — D22.5 MELANOCYTIC NEVI OF TRUNK: Status: ACTIVE | Noted: 2017-10-24

## 2017-10-24 PROCEDURE — 99203 OFFICE O/P NEW LOW 30 MIN: CPT | Mod: 25

## 2017-10-24 PROCEDURE — ? OBSERVATION AND MEASURE

## 2017-10-24 PROCEDURE — ? COUNSELING

## 2017-10-24 PROCEDURE — ? LIQUID NITROGEN

## 2017-10-24 PROCEDURE — 17110 DESTRUCTION B9 LES UP TO 14: CPT

## 2017-10-24 ASSESSMENT — LOCATION ZONE DERM
LOCATION ZONE: LEG
LOCATION ZONE: TRUNK
LOCATION ZONE: ARM
LOCATION ZONE: FACE

## 2017-10-24 ASSESSMENT — LOCATION SIMPLE DESCRIPTION DERM
LOCATION SIMPLE: RIGHT UPPER ARM
LOCATION SIMPLE: RIGHT FOREARM
LOCATION SIMPLE: RIGHT UPPER BACK
LOCATION SIMPLE: LEFT UPPER ARM
LOCATION SIMPLE: RIGHT PRETIBIAL REGION
LOCATION SIMPLE: RIGHT CHEEK
LOCATION SIMPLE: CHEST
LOCATION SIMPLE: LEFT PRETIBIAL REGION
LOCATION SIMPLE: RIGHT THIGH
LOCATION SIMPLE: LEFT THIGH
LOCATION SIMPLE: LEFT FOREARM
LOCATION SIMPLE: LEFT CHEEK

## 2017-10-24 ASSESSMENT — LOCATION DETAILED DESCRIPTION DERM
LOCATION DETAILED: RIGHT CENTRAL MALAR CHEEK
LOCATION DETAILED: LEFT ANTERIOR DISTAL THIGH
LOCATION DETAILED: LEFT INFERIOR CENTRAL MALAR CHEEK
LOCATION DETAILED: RIGHT INFERIOR UPPER BACK
LOCATION DETAILED: RIGHT PROXIMAL PRETIBIAL REGION
LOCATION DETAILED: RIGHT PROXIMAL DORSAL FOREARM
LOCATION DETAILED: RIGHT MEDIAL UPPER BACK
LOCATION DETAILED: LEFT PROXIMAL DORSAL FOREARM
LOCATION DETAILED: LEFT ANTERIOR PROXIMAL UPPER ARM
LOCATION DETAILED: RIGHT MID-UPPER BACK
LOCATION DETAILED: LEFT MEDIAL SUPERIOR CHEST
LOCATION DETAILED: RIGHT SUPERIOR MEDIAL UPPER BACK
LOCATION DETAILED: RIGHT ANTERIOR PROXIMAL UPPER ARM
LOCATION DETAILED: LEFT LATERAL PROXIMAL PRETIBIAL REGION
LOCATION DETAILED: RIGHT ANTERIOR DISTAL THIGH

## 2017-10-24 NOTE — PROCEDURE: LIQUID NITROGEN
Consent: The patient's consent was obtained including but not limited to risks of crusting, scabbing, blistering, scarring, darker or lighter pigmentary change, recurrence, incomplete removal and infection.
Post-Care Instructions: I reviewed with the patient in detail post-care instructions. Patient is to wear sunprotection, and avoid picking at any of the treated lesions. Pt may apply Vaseline to crusted or scabbing areas.
Medical Necessity Information: It is in your best interest to select a reason for this procedure from the list below. All of these items fulfill various CMS LCD requirements except the new and changing color options.
Detail Level: Detailed
Render Post-Care Instructions In Note?: no
Medical Necessity Clause: This procedure was medically necessary because the lesions that were treated were:

## 2018-05-01 ENCOUNTER — TELEPHONE (OUTPATIENT)
Dept: MEDICAL GROUP | Age: 53
End: 2018-05-01

## 2018-05-01 DIAGNOSIS — R73.01 IFG (IMPAIRED FASTING GLUCOSE): ICD-10-CM

## 2018-05-01 DIAGNOSIS — E78.2 MIXED HYPERLIPIDEMIA: ICD-10-CM

## 2018-05-01 DIAGNOSIS — Z11.59 NEED FOR HEPATITIS C SCREENING TEST: ICD-10-CM

## 2018-05-01 NOTE — TELEPHONE ENCOUNTER
1. Caller Name: Joseph Gray                                         Call Back Number: [phone] (home)       Patient approves a detailed voicemail message: N\A    2. SPECIFIC Action To Be Taken: Pt needs blood work orders ordered for his upcoming appointment on 5/10/18

## 2018-05-10 ENCOUNTER — OFFICE VISIT (OUTPATIENT)
Dept: MEDICAL GROUP | Age: 53
End: 2018-05-10
Payer: COMMERCIAL

## 2018-05-10 VITALS
HEART RATE: 85 BPM | OXYGEN SATURATION: 95 % | BODY MASS INDEX: 25.06 KG/M2 | TEMPERATURE: 99.1 F | HEIGHT: 71 IN | WEIGHT: 179 LBS | DIASTOLIC BLOOD PRESSURE: 82 MMHG | SYSTOLIC BLOOD PRESSURE: 130 MMHG

## 2018-05-10 DIAGNOSIS — F41.9 ANXIETY AND DEPRESSION: ICD-10-CM

## 2018-05-10 DIAGNOSIS — F51.01 PRIMARY INSOMNIA: ICD-10-CM

## 2018-05-10 DIAGNOSIS — E78.2 MIXED HYPERLIPIDEMIA: ICD-10-CM

## 2018-05-10 DIAGNOSIS — Z00.00 ROUTINE GENERAL MEDICAL EXAMINATION AT A HEALTH CARE FACILITY: ICD-10-CM

## 2018-05-10 DIAGNOSIS — R73.01 IFG (IMPAIRED FASTING GLUCOSE): ICD-10-CM

## 2018-05-10 DIAGNOSIS — F41.9 ANXIETY: ICD-10-CM

## 2018-05-10 DIAGNOSIS — F32.A ANXIETY AND DEPRESSION: ICD-10-CM

## 2018-05-10 PROCEDURE — 99396 PREV VISIT EST AGE 40-64: CPT | Performed by: INTERNAL MEDICINE

## 2018-05-10 RX ORDER — ALPRAZOLAM 0.25 MG/1
0.25 TABLET ORAL NIGHTLY PRN
Qty: 60 TAB | Refills: 2 | Status: SHIPPED | OUTPATIENT
Start: 2018-05-10 | End: 2018-07-09

## 2018-05-10 ASSESSMENT — ENCOUNTER SYMPTOMS
CONSTITUTIONAL NEGATIVE: 1
NEUROLOGICAL NEGATIVE: 1
INSOMNIA: 1
EYES NEGATIVE: 1
RESPIRATORY NEGATIVE: 1
NERVOUS/ANXIOUS: 1
CARDIOVASCULAR NEGATIVE: 1
MUSCULOSKELETAL NEGATIVE: 1
GASTROINTESTINAL NEGATIVE: 1

## 2018-05-10 ASSESSMENT — PATIENT HEALTH QUESTIONNAIRE - PHQ9: CLINICAL INTERPRETATION OF PHQ2 SCORE: 0

## 2018-05-10 NOTE — PROGRESS NOTES
"Subjective:      Joseph Gray is a 52 y.o. male who presents with Annual Exam (doing well)  and  ,  The patient is here for followup of chronic medical problems listed below. The patient is compliant with medications and having no side effects from them. Denies chest pain, abdominal pain, dyspnea, myalgias, or cough.   Patient Active Problem List    Diagnosis Date Noted   • Lumbar disc herniation with radiculopathy- ref neurosurgery 02/03/2017   • Reactive depression (situational)- prolonged, lost wife 2013 01/20/2017   • Primary insomnia 01/20/2017   • IFG (impaired fasting glucose) 01/20/2017   • Sebaceous cyst of right axilla 02/10/2016   • Eczema 03/19/2015   • Intracranial hypotension- idiopathic, intermittent, orthostatic; no rx except bedrest; no blood patch ever done 03/19/2015   • Vitamin D deficiency disease 12/04/2013   • Mixed hyperlipidemia 12/04/2013   • Anxiety 11/06/2013     No Known Allergies  Outpatient Medications Prior to Visit   Medication Sig Dispense Refill   • alprazolam (XANAX) 0.25 MG Tab TAKE 1 TABLET BY MOUTH TWICE A DAY AS NEEDED FOR SLEEP OR FOR ANXIETY 60 Tab 0   • ibuprofen (MOTRIN) 800 MG Tab Take 1 Tab by mouth every 8 hours as needed. 90 Tab 1     No facility-administered medications prior to visit.                HPI    Review of Systems   Constitutional: Negative.    HENT: Negative.    Eyes: Negative.    Respiratory: Negative.    Cardiovascular: Negative.    Gastrointestinal: Negative.    Genitourinary: Negative.    Musculoskeletal: Negative.    Skin: Negative.    Neurological: Negative.    Endo/Heme/Allergies: Negative.    Psychiatric/Behavioral: The patient is nervous/anxious and has insomnia.           Objective:     /82   Pulse 85   Temp 37.3 °C (99.1 °F)   Ht 1.816 m (5' 11.5\")   Wt 81.2 kg (179 lb)   SpO2 95%   BMI 24.62 kg/m²      Physical Exam   Constitutional: He is oriented to person, place, and time. He appears well-developed and well-nourished. No " distress.   HENT:   Head: Normocephalic and atraumatic.   Right Ear: External ear normal.   Left Ear: External ear normal.   Nose: Nose normal.   Mouth/Throat: Oropharynx is clear and moist. No oropharyngeal exudate.   Eyes: Conjunctivae and EOM are normal. Pupils are equal, round, and reactive to light. Right eye exhibits no discharge. Left eye exhibits no discharge. No scleral icterus.   Neck: Normal range of motion. Neck supple. No JVD present. No tracheal deviation present. No thyromegaly present.   Cardiovascular: Normal rate, regular rhythm, normal heart sounds and intact distal pulses.  Exam reveals no gallop and no friction rub.    No murmur heard.  Pulmonary/Chest: Effort normal and breath sounds normal. No stridor. No respiratory distress. He has no wheezes. He has no rales. He exhibits no tenderness.   Abdominal: Soft. Bowel sounds are normal. He exhibits no distension and no mass. There is no tenderness. There is no rebound and no guarding.   Musculoskeletal: Normal range of motion. He exhibits no edema or tenderness.   Lymphadenopathy:     He has no cervical adenopathy.   Neurological: He is alert and oriented to person, place, and time. He has normal reflexes. He displays normal reflexes. He exhibits normal muscle tone. Coordination normal.   Skin: Skin is warm and dry. No rash noted. He is not diaphoretic. No erythema. No pallor.   Psychiatric: He has a normal mood and affect. His behavior is normal. Judgment and thought content normal.     No visits with results within 1 Month(s) from this visit.   Latest known visit with results is:   Abstract on 04/18/2017   Component Date Value   • Glycohemoglobin 04/11/2017 5.8       Lab Results   Component Value Date/Time    HBA1C 5.8 04/11/2017    HBA1C 5.7 (H) 04/18/2014 09:01 AM     Lab Results   Component Value Date/Time    SODIUM 140 04/18/2014 09:01 AM    POTASSIUM 3.9 04/18/2014 09:01 AM    CHLORIDE 104 04/18/2014 09:01 AM    CO2 25 04/18/2014 09:01 AM     GLUCOSE 111 (H) 04/18/2014 09:01 AM    BUN 14 04/18/2014 09:01 AM    CREATININE 1.23 04/18/2014 09:01 AM    ALKPHOSPHAT 48 04/18/2014 09:01 AM    ASTSGOT 18 04/18/2014 09:01 AM    ALTSGPT 18 04/18/2014 09:01 AM    TBILIRUBIN 0.9 04/18/2014 09:01 AM     No results found for: INR  Lab Results   Component Value Date/Time    CHOLSTRLTOT 202 (H) 04/18/2014 09:01 AM     (H) 04/18/2014 09:01 AM    HDL 61 04/18/2014 09:01 AM    TRIGLYCERIDE 59 04/18/2014 09:01 AM       No results found for: TESTOSTERONE  No results found for: TSH  Lab Results   Component Value Date/Time    FREET4 0.93 12/03/2013 08:18 AM     No results found for: URICACID  No components found for: VITB12  Lab Results   Component Value Date/Time    25HYDROXY 33 04/18/2014 09:01 AM    25HYDROXY 30 12/03/2013 08:18 AM   '              Assessment/Plan:     1. Routine general medical examination at a health care facility     - COMP METABOLIC PANEL; Future  - LIPID PROFILE; Future  - CBC WITH DIFFERENTIAL; Future  - HEMOGLOBIN A1C; Future    2. IFG (impaired fasting glucose)    Under good control. Continue same regimen.    - HEMOGLOBIN A1C; Future    3. Primary insomnia  Under good control. Continue same regimen.     - ALPRAZolam (XANAX) 0.25 MG Tab; Take 1 Tab by mouth at bedtime as needed for Sleep or Anxiety for up to 60 days.  Dispense: 60 Tab; Refill: 2    4. Anxiety  Under good control. Continue same regimen.       5. Mixed hyperlipidemia     Under good control. Continue same regimen.    6. Anxiety and depression     Under good control. Continue same regimen.    - ALPRAZolam (XANAX) 0.25 MG Tab; Take 1 Tab by mouth at bedtime as needed for Sleep or Anxiety for up to 60 days.  Dispense: 60 Tab; Refill: 2    30 minute face-to-face encounter took place today.  More than half of this time was spent in the coordination of care of the above problems, as well as counseling.

## 2018-10-04 ENCOUNTER — TELEPHONE (OUTPATIENT)
Dept: MEDICAL GROUP | Age: 53
End: 2018-10-04

## 2018-10-04 DIAGNOSIS — H92.09 OTALGIA, UNSPECIFIED LATERALITY: ICD-10-CM

## 2018-10-04 NOTE — TELEPHONE ENCOUNTER
1. Caller Name: Joseph Gray                                           Call Back Number: [phone] (home)         Patient approves a detailed voicemail message: yes    2. SPECIFIC Action To Be Taken: Referral pending, please sign.    3. Diagnosis/Clinical Reason for Request: Pt requesting for ear pain     4. Specialty & Provider Name/Lab/Imaging Location: ENT     5. Is appointment scheduled for requested order/referral: no    Patient was informed they will receive a return phone call from the office ONLY if there are any questions before processing their request. Advised to call back if they haven't received a call from the referral department in 5 days.

## 2018-10-04 NOTE — TELEPHONE ENCOUNTER
----- Message from Mary Perry sent at 10/4/2018 12:44 PM PDT -----  Regarding: FW: Ear issues  Contact: [phone]      ----- Message -----  From: Joseph Gray  Sent: 10/4/2018  12:14 PM  To: Lara Maurice  Subject: Ear issues                                       Topic: Referral Status    Been experiencing: clogged ears, buzzing, muffled and sensitive hearing, constant pressure. Have done courses of decongestants, flonase, nasal irrigation, allergy meds, ear wax removal, etc... Nothing seems to work, and it keeps reoccurring and is becoming rather bothersome!!! Was interested in getting a referral to an ENT.

## 2019-03-29 ENCOUNTER — OFFICE VISIT (OUTPATIENT)
Dept: URGENT CARE | Facility: MEDICAL CENTER | Age: 54
End: 2019-03-29
Payer: COMMERCIAL

## 2019-03-29 VITALS
OXYGEN SATURATION: 100 % | BODY MASS INDEX: 25.06 KG/M2 | SYSTOLIC BLOOD PRESSURE: 140 MMHG | WEIGHT: 185 LBS | HEIGHT: 72 IN | HEART RATE: 53 BPM | TEMPERATURE: 98.4 F | DIASTOLIC BLOOD PRESSURE: 92 MMHG

## 2019-03-29 DIAGNOSIS — K13.0 LESION OF LIP: Primary | ICD-10-CM

## 2019-03-29 PROCEDURE — 99214 OFFICE O/P EST MOD 30 MIN: CPT | Performed by: PHYSICIAN ASSISTANT

## 2019-03-29 RX ORDER — METHYLPREDNISOLONE 4 MG/1
TABLET ORAL
Qty: 21 TAB | Refills: 0 | Status: SHIPPED | OUTPATIENT
Start: 2019-03-29 | End: 2019-04-18

## 2019-03-29 RX ORDER — VALACYCLOVIR HYDROCHLORIDE 1 G/1
1000 TABLET, FILM COATED ORAL 3 TIMES DAILY
Qty: 21 TAB | Refills: 0 | Status: SHIPPED | OUTPATIENT
Start: 2019-03-29 | End: 2019-04-05

## 2019-03-29 NOTE — PROGRESS NOTES
"Subjective:      Pt is a 53 y.o. male who presents with Lip Laceration (sores )            HPI  This is a new problem. Pt notes white lesions of lower lip x \"3 months which come and go\" and he is not sure if it is cancerous or HSV infection. Pt has not taken any Rx medications for this condition. Pt states the pain is a 1/10, aching in nature and worse at night. Pt denies new detergents, soaps, make-up, hygiene products, medications, foods, exposure to chemicals.  Pt denies CP, SOB, NVD, paresthesias, headaches, dizziness, change in vision, hives, or other joint pain. The pt's medication list, problem list, and allergies have been evaluated and reviewed during today's visit.      PMH:  Negative per pt.      PSH:  Past Surgical History:   Procedure Laterality Date   • APPENDECTOMY     • OTHER      broken arm   • OTHER      broken thumb   • ROTATOR CUFF REPAIR         Fam Hx:    family history includes Cancer in his father; Hypertension in his father.    Soc HX:  Social History     Social History   • Marital status:      Spouse name: N/A   • Number of children: N/A   • Years of education: N/A     Occupational History   • Not on file.     Social History Main Topics   • Smoking status: Never Smoker   • Smokeless tobacco: Never Used   • Alcohol use 0.0 oz/week      Comment: 1-3 drinks nightly   • Drug use: No   • Sexual activity: Not on file     Other Topics Concern   • Not on file     Social History Narrative   • No narrative on file         Medications:    Current Outpatient Prescriptions:   •  valacyclovir (VALTREX) 1 GM Tab, Take 1 Tab by mouth 3 times a day for 7 days., Disp: 21 Tab, Rfl: 0  •  MethylPREDNISolone (MEDROL DOSEPAK) 4 MG Tablet Therapy Pack, Use as directed, Disp: 21 Tab, Rfl: 0      Allergies:  Patient has no known allergies.    ROS  Constitutional: Negative for fever, chills and malaise/fatigue.   HENT: Negative for congestion and sore throat.  +lip lesions  Eyes: Negative for blurred vision, " double vision and photophobia.   Respiratory: Negative for cough and shortness of breath.  Cardiovascular: Negative for chest pain and palpitations.   Gastrointestinal: Negative for heartburn, nausea, vomiting, abdominal pain, diarrhea and constipation.   Genitourinary: Negative for dysuria and flank pain.   Musculoskeletal: Negative for joint pain and myalgias.   Skin: Negative for itching and rash.   Neurological: Negative for dizziness, tingling and headaches.   Endo/Heme/Allergies: Does not bruise/bleed easily.   Psychiatric/Behavioral: Negative for depression. The patient is not nervous/anxious.           Objective:     /92   Pulse (!) 53   Temp 36.9 °C (98.4 °F) (Temporal)   Ht 1.829 m (6')   Wt 83.9 kg (185 lb)   SpO2 100%   BMI 25.09 kg/m²      Physical Exam   HENT:   Mouth/Throat: Uvula is midline, oropharynx is clear and moist and mucous membranes are normal. He does not have dentures. No oral lesions. No trismus in the jaw. Normal dentition. No dental abscesses, uvula swelling, lacerations or dental caries.              Constitutional: PT is oriented to person, place, and time. PT appears well-developed and well-nourished. No distress.   HENT:   Head: Normocephalic and atraumatic.   Eyes: Conjunctivae normal and EOM are normal. Pupils are equal, round, and reactive to light.   Neck: Normal range of motion. Neck supple. No thyromegaly present.   Cardiovascular: Normal rate, regular rhythm, normal heart sounds and intact distal pulses.  Exam reveals no gallop and no friction rub.    No murmur heard.  Pulmonary/Chest: Effort normal and breath sounds normal. No respiratory distress. PT has no wheezes. PT has no rales. Pt exhibits no tenderness.   Abdominal: Soft. Bowel sounds are normal. PT exhibits no distension and no mass. There is no tenderness. There is no rebound and no guarding.   Musculoskeletal: Normal range of motion. PT exhibits no edema and no tenderness.   Neurological: PT is alert  and oriented to person, place, and time. PT has normal reflexes. No cranial nerve deficit.   Skin: Skin is warm and dry. No rash noted. PT is not diaphoretic. No erythema.       Psychiatric: PT has a normal mood and affect. PT behavior is normal. Judgment and thought content normal.        Assessment/Plan:     1. Lesions of lower lip  - valacyclovir (VALTREX) 1 GM Tab; Take 1 Tab by mouth 3 times a day for 7 days.  Dispense: 21 Tab; Refill: 0  - MethylPREDNISolone (MEDROL DOSEPAK) 4 MG Tablet Therapy Pack; Use as directed  Dispense: 21 Tab; Refill: 0  - REFERRAL TO DERMATOLOGY    Rest, fluids encouraged.  AVS with medical info given.  Pt was in full understanding and agreement with the plan.  Differential diagnosis, natural history, supportive care, and indications for immediate follow-up discussed. All questions answered. Patient agrees with the plan of care.  Follow-up as needed if symptoms worsen or fail to improve.

## 2019-03-29 NOTE — PATIENT INSTRUCTIONS
Stomatitis  Stomatitis is a condition that causes inflammation in your mouth. It can affect a part of your mouth or your whole mouth. The condition often affects your cheek, teeth, gums, lips, and tongue. Stomatitis can also affect the mucous membranes that surround your mouth (mucosa).  Pain from stomatitis can make it hard for you to eat or drink. Severe cases of this condition can lead to dehydration or poor nutrition.  What are the causes?  Common causes of this condition include:  · Viruses, such as cold sores or oral herpes and shingles.  · Canker sores.  · Bacterial infections.  · Fungus or yeast infections, such as oral thrush.  · Not getting adequate nutrition.  · Injury to your mouth. This can be from:  ¨ Dentures or braces that do not fit well.  ¨ Biting your tongue or cheek.  ¨ Burning your mouth.  ¨ Having sharp or broken teeth.  · Gum disease.  · Using tobacco, especially chewing tobacco.  · Allergies to foods, medicines, or substances that are used in your mouth.  · Medicines, including cancer medicines (chemotherapy), antihistamines, and seizure medicines.  In some cases, the cause may not be known.  What increases the risk?  This condition is more likely to develop in people who:  · Have poor oral hygiene or poor nutrition.  · Have any condition that causes a dry mouth.  · Are under a lot of physical or emotional stress.  · Have any condition that weakens the body’s defense system (immune system).  · Are being treated for cancer.  · Smoke.  What are the signs or symptoms?  The most common symptoms of this condition are pain, swelling, and redness inside your mouth. The pain may feel like burning or stinging. It may get worse from eating or drinking. Other symptoms include:  · Painful, shallow sores (ulcers) in the mouth.  · Blisters in the mouth.  · Bleeding gums.  · Swollen gums.  · Irritability and fatigue.  · Bad breath.  · Bad taste in the mouth.  · Fever.  How is this diagnosed?  This condition  is diagnosed with a physical exam to check for bleeding gums and mouth ulcers. You may also have other tests, including:  · Blood tests to look for infection or vitamin deficiencies.  · Mouth swab to get a fluid sample to test for bacteria (culture).  · Tissue sample from an ulcer to examine under a microscope (biopsy).  How is this treated?  Treatment for stomatitis depends on the cause. Treatment may include medicines, such as:  · Over-the counter (OTC) pain medicines.  · Topical anesthetic to numb the area if you have severe pain.  · Antibiotics to treat a bacterial infection.  · Antifungals to treat a fungal infection.  · Antivirals to treat a viral infection.  · Mouth rinses that contain steroids to reduce the swelling in your mouth.  · Other medicines to coat or numb your mouth.  Follow these instructions at home:  Medicines  · Take medicines only as directed by your health care provider.  · If you were prescribed an antibiotic, finish all of it even if you start to feel better.  Lifestyle  · Practice good oral hygiene:  ¨ Gently brush your teeth with a soft, nylon-bristled toothbrush two times each day.  ¨ Floss your teeth every day.  ¨ Have your teeth cleaned regularly, as recommended by your dentist.  · Eat a balanced diet.Do not eat:  ¨ Spicy foods.  ¨ Citrus, such as oranges.  ¨ Foods that have sharp edges, such as chips.  · Avoid any foods or other allergens that you think may be causing your stomatitis.  · If you have dentures, make sure that they are properly fitted.  · Do not use any tobacco products, including cigarettes, chewing tobacco, or electronic cigarettes. If you need help quitting, ask your health care provider.  · Find ways to reduce stress. Try yoga or meditation. Ask your health care provider for other ideas.  General instructions  · Use a salt-water rinse for pain as directed by your health care provider. Mix 1 tsp of salt in 2 cups of water.  · Drink enough fluid to keep your urine  clear or pale yellow. This will keep you hydrated.  Contact a health care provider if:  · Your symptoms get worse.  · You develop new symptoms, especially:  ¨ A rash.  ¨ New symptoms that do not involve your mouth area.  · Your symptoms last longer than three weeks.  · Your stomatitis goes away and then returns.  · You have a harder time eating and drinking normally.  · You have increasing fatigue or weakness.  · You lose your appetite or you feel nauseous.  · You have a fever.  This information is not intended to replace advice given to you by your health care provider. Make sure you discuss any questions you have with your health care provider.  Document Released: 10/14/2008 Document Revised: 08/16/2017 Document Reviewed: 12/14/2015  [employer] Interactive Patient Education © 2017 [employer]

## 2019-04-09 ENCOUNTER — OFFICE VISIT (OUTPATIENT)
Dept: DERMATOLOGY | Facility: IMAGING CENTER | Age: 54
End: 2019-04-09
Payer: COMMERCIAL

## 2019-04-09 VITALS — HEIGHT: 72 IN | TEMPERATURE: 98.8 F | WEIGHT: 180 LBS | BODY MASS INDEX: 24.38 KG/M2

## 2019-04-09 DIAGNOSIS — D22.9 MULTIPLE NEVI: ICD-10-CM

## 2019-04-09 DIAGNOSIS — Z86.19 HISTORY OF HERPES LABIALIS: ICD-10-CM

## 2019-04-09 DIAGNOSIS — L82.1 SEBORRHEIC KERATOSES: ICD-10-CM

## 2019-04-09 PROCEDURE — 99203 OFFICE O/P NEW LOW 30 MIN: CPT | Performed by: DERMATOLOGY

## 2019-04-09 ASSESSMENT — ENCOUNTER SYMPTOMS
FEVER: 0
CHILLS: 0

## 2019-04-09 NOTE — PROGRESS NOTES
Dermatology New Patient Visit    Chief Complaint   Patient presents with   • Skin Lesion       Subjective:     HPI:   Joseph Gray is a 53 y.o. male presenting for    HPI: non healing Skin lesion   Was seen and treated in urgent care. Given a Medrol dose pack , valtrex .   Location: lower lip   Time present: 3 weeks   Painful lesion: No  Itching lesion: No  Enlarging lesion: No  Anything make it better or worse? Was aggravated by the sun (skiiing), seeming to improve    HPI/location: spot on the right forearm  Time present: months  Painful lesion: No  Itching lesion: No  Enlarging lesion: No  Anything make it better or worse? N/a    History of skin cancer: No  History of biopsies:Yes, Details:  Mole removed on thigh 6 years ago benign   History of blistering/severe sunburns:Yes, Details: several during youth  Family history of skin cancer:No  Family history of atypical moles:No  Would like a full body exam 2/2 does not keep an eye on his back        No past medical history on file.    Current Outpatient Prescriptions on File Prior to Visit   Medication Sig Dispense Refill   • MethylPREDNISolone (MEDROL DOSEPAK) 4 MG Tablet Therapy Pack Use as directed 21 Tab 0     No current facility-administered medications on file prior to visit.        No Known Allergies    Family History   Problem Relation Age of Onset   • Hypertension Father    • Cancer Father        Social History     Social History   • Marital status:      Spouse name: N/A   • Number of children: N/A   • Years of education: N/A     Occupational History   • Not on file.     Social History Main Topics   • Smoking status: Never Smoker   • Smokeless tobacco: Never Used   • Alcohol use 0.0 oz/week      Comment: 1-3 drinks nightly   • Drug use: No   • Sexual activity: Not on file     Other Topics Concern   • Not on file     Social History Narrative   • No narrative on file       Review of Systems   Constitutional: Negative for chills and fever.    Skin: Negative for itching and rash.   All other systems reviewed and are negative.       Objective:     A full mucocutaneous exam was completed including: scalp, hair, ears, face, eyelids, conjunctiva, lips, gums/tongue/oropharynx, neck, chest, abdomen, back, left and right upper extremities (including hands/digits and fingernails), left and right lower extremities (including feet/toes, toenails), buttocks, excluding external genitalia (patient refusal) with the following pertinent findings listed below. Remaining above-listed examined areas within normal limits / negative for rashes or lesions.    Temp 37.1 °C (98.8 °F)   Ht 1.829 m (6')   Wt 81.6 kg (180 lb)     Physical Exam   Constitutional: He is oriented to person, place, and time and well-developed, well-nourished, and in no distress.   HENT:   Head: Normocephalic and atraumatic.       Right Ear: External ear normal.   Left Ear: External ear normal.   Nose: Nose normal.   Mouth/Throat: Oropharynx is clear and moist.   Eyes: Conjunctivae and lids are normal.   Neck: Normal range of motion. Neck supple.   Cardiovascular: Intact distal pulses.    Pulmonary/Chest: Effort normal.   Neurological: He is alert and oriented to person, place, and time.   Skin: Skin is warm and dry.        Psychiatric: Mood and affect normal.   Vitals reviewed.      DATA: none applicable to review    Assessment and Plan:     1. Multiple nevi  - Benign-appearing nature of lesions discussed. Advised to return to clinic for any new or concerning changes.  - Skin cancer education  - discussed importance of sun protective clothing, eyewear  - discussed importance of daily use of broad spectrum sunscreen with SPF 30 or greater, as well as need for reapplication ~every 2 hours when exposed to UVR  - discussed importance of regular self-exams, ideally once per month, every 1-2 year exams in clinic  - ABCDE's of melanoma discussed  - patient to bring any new or concerning lesions to my  attention    2. Seborrheic keratoses  - Benign-appearing nature of lesions discussed. Advised to return to clinic for any new or concerning changes.    3. History of suspected herpes labialis  - rtc if recurs    Followup: Return if symptoms worsen or fail to improve.    Kassidy Cody M.D.

## 2019-04-12 ENCOUNTER — TELEPHONE (OUTPATIENT)
Dept: MEDICAL GROUP | Age: 54
End: 2019-04-12

## 2019-04-12 ENCOUNTER — OFFICE VISIT (OUTPATIENT)
Dept: URGENT CARE | Facility: MEDICAL CENTER | Age: 54
End: 2019-04-12
Payer: COMMERCIAL

## 2019-04-12 VITALS
TEMPERATURE: 98.4 F | BODY MASS INDEX: 25.14 KG/M2 | HEART RATE: 82 BPM | WEIGHT: 185.6 LBS | SYSTOLIC BLOOD PRESSURE: 120 MMHG | DIASTOLIC BLOOD PRESSURE: 82 MMHG | HEIGHT: 72 IN | OXYGEN SATURATION: 97 %

## 2019-04-12 DIAGNOSIS — Z86.19 HISTORY OF COLD SORES: ICD-10-CM

## 2019-04-12 PROCEDURE — 99213 OFFICE O/P EST LOW 20 MIN: CPT | Performed by: NURSE PRACTITIONER

## 2019-04-12 RX ORDER — VALACYCLOVIR HYDROCHLORIDE 1 G/1
1000 TABLET, FILM COATED ORAL 2 TIMES DAILY
Qty: 20 TAB | Refills: 0 | Status: SHIPPED | OUTPATIENT
Start: 2019-04-12 | End: 2019-04-18

## 2019-04-12 ASSESSMENT — ENCOUNTER SYMPTOMS: LIP LACERATION: 1

## 2019-04-12 NOTE — TELEPHONE ENCOUNTER
Phone Number Called: [phone] (home)       Message: Left VM for Pt to call back.  [employer] message sent     Left Message for patient to call back: yes

## 2019-04-12 NOTE — TELEPHONE ENCOUNTER
VOICEMAIL  1. Caller Name: Joseph Gray                        Call Back Number: [phone] (home)       2. Message: Pt left VM requesting a referral to an optometrist.  Pt was frustrated that he got a referral to a ophthalmologist instead of an optometrist previously     3. Patient approves office to leave a detailed voicemail/MyChart message: yes

## 2019-04-13 NOTE — PROGRESS NOTES
Subjective:      Joseph Gray is a 53 y.o. male who presents with Lip Laceration (lesion/ cold sore )    History reviewed. No pertinent past medical history.  Social History     Social History   • Marital status:      Spouse name: N/A   • Number of children: N/A   • Years of education: N/A     Occupational History   • Not on file.     Social History Main Topics   • Smoking status: Never Smoker   • Smokeless tobacco: Never Used   • Alcohol use 0.0 oz/week      Comment: 1-3 drinks nightly   • Drug use: No   • Sexual activity: Not on file     Other Topics Concern   • Not on file     Social History Narrative   • No narrative on file     Family History   Problem Relation Age of Onset   • Hypertension Father    • Cancer Father        Allergies: Patient has no known allergies.    Patient is a 53-year-old male who presents with complaint of possible recurrent cold sore to the middle of the lower lip.  He recently finished a course of Valtrex and then reports over the last 2 days that he believes this is returning.  He is concerned because he is worried that he may be immunosuppressed, such as with HIV infection.  He does not have any history of immunosuppression previously no history of HIV or high risk behavior or exposure.  Nevertheless, patient states he would like to be checked for HIV.          Other   This is a recurrent problem. The current episode started yesterday. The problem occurs constantly. The problem has been unchanged. Nothing aggravates the symptoms. He has tried nothing for the symptoms. The treatment provided no relief.       Review of Systems   HENT:        Cold sore on the lower lip   All other systems reviewed and are negative.         Objective:     /82   Pulse 82   Temp 36.9 °C (98.4 °F)   Ht 1.829 m (6')   Wt 84.2 kg (185 lb 9.6 oz)   SpO2 97%   BMI 25.17 kg/m²      Physical Exam   Constitutional: He appears well-developed and well-nourished.   HENT:   Slight raised area  to the middle of the lower lip   Skin: Skin is warm and dry.   Psychiatric: He has a normal mood and affect. His behavior is normal. Judgment and thought content normal.   Vitals reviewed.              Assessment/Plan:     1. History of cold sores    - HIV AG/AB COMBO ASSAY SCREENING; Future; ordered at patient's request  - valacyclovir (VALTREX) 1 GM Tab; Take 1 Tab by mouth 2 times a day.  Dispense: 20 Tab; Refill: 0  -Follow-up otherwise for persistent or worsening of symptoms

## 2019-04-18 ENCOUNTER — OFFICE VISIT (OUTPATIENT)
Dept: MEDICAL GROUP | Age: 54
End: 2019-04-18
Payer: COMMERCIAL

## 2019-04-18 VITALS
DIASTOLIC BLOOD PRESSURE: 78 MMHG | RESPIRATION RATE: 16 BRPM | BODY MASS INDEX: 25.19 KG/M2 | HEIGHT: 72 IN | SYSTOLIC BLOOD PRESSURE: 120 MMHG | TEMPERATURE: 97.9 F | WEIGHT: 186 LBS | OXYGEN SATURATION: 97 % | HEART RATE: 82 BPM

## 2019-04-18 DIAGNOSIS — E55.9 VITAMIN D DEFICIENCY DISEASE: ICD-10-CM

## 2019-04-18 DIAGNOSIS — K14.6 BURNING TONGUE SYNDROME: ICD-10-CM

## 2019-04-18 DIAGNOSIS — E78.2 MIXED HYPERLIPIDEMIA: ICD-10-CM

## 2019-04-18 DIAGNOSIS — Z11.3 SCREEN FOR STD (SEXUALLY TRANSMITTED DISEASE): ICD-10-CM

## 2019-04-18 DIAGNOSIS — R73.01 IFG (IMPAIRED FASTING GLUCOSE): ICD-10-CM

## 2019-04-18 DIAGNOSIS — Z86.19 HISTORY OF COLD SORES: ICD-10-CM

## 2019-04-18 DIAGNOSIS — Z00.00 HEALTHCARE MAINTENANCE: ICD-10-CM

## 2019-04-18 DIAGNOSIS — E53.8 VITAMIN B 12 DEFICIENCY: ICD-10-CM

## 2019-04-18 DIAGNOSIS — K13.0 LIP LESION: ICD-10-CM

## 2019-04-18 PROCEDURE — 99215 OFFICE O/P EST HI 40 MIN: CPT | Performed by: INTERNAL MEDICINE

## 2019-04-18 RX ORDER — VALACYCLOVIR HYDROCHLORIDE 1 G/1
1000 TABLET, FILM COATED ORAL 2 TIMES DAILY
Qty: 20 TAB | Refills: 0 | Status: SHIPPED | OUTPATIENT
Start: 2019-04-18 | End: 2019-04-29

## 2019-04-18 ASSESSMENT — ENCOUNTER SYMPTOMS
CARDIOVASCULAR NEGATIVE: 1
CONSTITUTIONAL NEGATIVE: 1
NEUROLOGICAL NEGATIVE: 1
EYES NEGATIVE: 1
RESPIRATORY NEGATIVE: 1
MUSCULOSKELETAL NEGATIVE: 1
GASTROINTESTINAL NEGATIVE: 1
PSYCHIATRIC NEGATIVE: 1

## 2019-04-18 ASSESSMENT — PATIENT HEALTH QUESTIONNAIRE - PHQ9
2. FEELING DOWN, DEPRESSED, IRRITABLE, OR HOPELESS: NOT AT ALL
6. FEELING BAD ABOUT YOURSELF - OR THAT YOU ARE A FAILURE OR HAVE LET YOURSELF OR YOUR FAMILY DOWN: NOT AL ALL
SUM OF ALL RESPONSES TO PHQ QUESTIONS 1-9: 0
1. LITTLE INTEREST OR PLEASURE IN DOING THINGS: NOT AT ALL
4. FEELING TIRED OR HAVING LITTLE ENERGY: NOT AT ALL
7. TROUBLE CONCENTRATING ON THINGS, SUCH AS READING THE NEWSPAPER OR WATCHING TELEVISION: NOT AT ALL
8. MOVING OR SPEAKING SO SLOWLY THAT OTHER PEOPLE COULD HAVE NOTICED. OR THE OPPOSITE, BEING SO FIGETY OR RESTLESS THAT YOU HAVE BEEN MOVING AROUND A LOT MORE THAN USUAL: NOT AT ALL
3. TROUBLE FALLING OR STAYING ASLEEP OR SLEEPING TOO MUCH: NOT AT ALL
9. THOUGHTS THAT YOU WOULD BE BETTER OFF DEAD, OR OF HURTING YOURSELF: NOT AT ALL
5. POOR APPETITE OR OVEREATING: NOT AT ALL
SUM OF ALL RESPONSES TO PHQ9 QUESTIONS 1 AND 2: 0

## 2019-04-18 NOTE — PROGRESS NOTES
Subjective:      Joseph Gray is a 53 y.o. male who presents with Cold Sores  And patient also wants annual physical and evaluation for possible immune deficiency and further evaluation of chronic lip and tongue burning.  Screen for STD as well.    HPI    Patient complains of cold sores on his bottom lip. He was seen in urgent care and given a course of Valacyclovir with minor alleviation. He notes that it took several days for it to go away so he wanted to follow up here to be sure it was going away. He still has four days left on the course of Valtrex. He states that the lip lesions are improving, but his tongue is still burning.     The patient is here for followup of chronic medical problems listed below. The patient is compliant with medications and having no side effects from them. Denies chest pain, abdominal pain, dyspnea, myalgias, or cough.    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 20 Tab 0   • MethylPREDNISolone (MEDROL DOSEPAK) 4 MG Tablet Therapy Pack Use as directed (Patient not taking: Reported on 4/9/2019) 21 Tab 0     No facility-administered medications prior to visit.         No Known Allergies    Review of Systems   Constitutional: Negative.    HENT: Negative.    Eyes: Negative.    Respiratory: Negative.     Cardiovascular: Negative.    Gastrointestinal: Negative.    Genitourinary: Negative.    Musculoskeletal: Negative.    Skin: Negative.    Neurological: Negative.    Endo/Heme/Allergies: Negative.    Psychiatric/Behavioral: Negative.    All other systems reviewed and are negative.           Objective:     /78   Pulse 82   Temp 36.6 °C (97.9 °F) (Temporal)   Resp 16   Ht 1.829 m (6')   Wt 84.4 kg (186 lb)   SpO2 97%   BMI 25.23 kg/m²     Physical Exam   Constitutional: Oriented to person, place, and time. Appears well-developed and well-nourished. No distress.   Head: Normocephalic and atraumatic.   Right Ear: External ear normal.   Left Ear: External ear normal.   Nose: Nose normal.   Mouth/Throat: Oropharynx is clear and moist. No oropharyngeal exudate.   Eyes: Pupils are equal, round, and reactive to light. Conjunctivae and EOM are normal. Right eye exhibits no discharge. Left eye exhibits no discharge. No scleral icterus.   Neck: Normal range of motion. Neck supple. No JVD present. No tracheal deviation present. No thyromegaly present.   Cardiovascular: Normal rate, regular rhythm, normal heart sounds and intact distal pulses.  Exam reveals no gallop and no friction rub.    No murmur heard.  Pulmonary/Chest: Effort normal. No stridor. No respiratory distress. No wheezing or rales. No tenderness.   Abdominal: Soft. Bowel sounds are normal. No distension and no mass. There is no tenderness. There is no rebound and no guarding. No hernia.   Musculoskeletal: Normal range of motion No edema or tenderness.   Lymphadenopathy: No cervical adenopathy.   Neurological: Alert and oriented to person, place, and time. Normal reflexes. Normal reflexes. No cranial nerve deficit. Normal muscle tone. Coordination normal.   Skin: Skin is warm and dry. No rash noted. Not diaphoretic. No erythema. No pallor.   Psychiatric: Normal mood and affect. Behavior is normal. Judgment and thought content normal.   Nursing note and  "vitals reviewed.      Lab Results   Component Value Date/Time    HBA1C 5.8 04/11/2017    HBA1C 5.7 (H) 04/18/2014 09:01 AM     Lab Results   Component Value Date/Time    SODIUM 140 04/18/2014 09:01 AM    POTASSIUM 3.9 04/18/2014 09:01 AM    CHLORIDE 104 04/18/2014 09:01 AM    CO2 25 04/18/2014 09:01 AM    GLUCOSE 111 (H) 04/18/2014 09:01 AM    BUN 14 04/18/2014 09:01 AM    CREATININE 1.23 04/18/2014 09:01 AM    ALKPHOSPHAT 48 04/18/2014 09:01 AM    ASTSGOT 18 04/18/2014 09:01 AM    ALTSGPT 18 04/18/2014 09:01 AM    TBILIRUBIN 0.9 04/18/2014 09:01 AM     No results found for: INR  Lab Results   Component Value Date/Time    CHOLSTRLTOT 202 (H) 04/18/2014 09:01 AM     (H) 04/18/2014 09:01 AM    HDL 61 04/18/2014 09:01 AM    TRIGLYCERIDE 59 04/18/2014 09:01 AM       No results found for: TESTOSTERONE  No results found for: TSH  Lab Results   Component Value Date/Time    FREET4 0.93 12/03/2013 08:18 AM     No results found for: URICACID  No components found for: VITB12  Lab Results   Component Value Date/Time    25HYDROXY 33 04/18/2014 09:01 AM    25HYDROXY 30 12/03/2013 08:18 AM          Assessment/Plan:     1. Lip lesions- resolved.  I see no lip lesions at this time although the patient continues to believe that he still has some.  They are not visible to me however.  He will finish off his Valtrex as he had documented \"cold sores\" on previous urgent care visit a week or so ago.      Cold sore from one month. Alleviated with course of Valacyclovir.    2. Burning tongue syndrome- prob viral syndrome; r/o b12 def or metab disorder-this is a new problem of unclear etiology.  May be secondary to disorder of usual microbial floor the mouth although I see no oral lesions.  Patient reassured no significant antibodies are seen.  Told to avoid overuse of any mouthwashes or peroxide orally.  Rinse only with regular water or salt water.    Rule out B12 deficiency or occult inflammatory disorder  Patient reports cold sores " that were alleviated with Valacyclovir, but sore tongue burning has remained. Plan to evaluate with:    - WESTERGREN SED RATE; Future  - VITAMIN B12; Future  - FOLATE; Future    3. Mixed hyperlipidemia-needs follow-up.  Labs ordered.  Continue with high-protein low-carb diet.  Return in 2 weeks to discuss results.  Last lipid panel completed on 10/7/18. Cholesterol 196; Triglycerides 61; HDL 61; . Plan to evaluate with:    - TSH; Future  - Comp Metabolic Panel; Future  - Lipid Profile; Future  - CBC WITH DIFFERENTIAL; Future  - CRP HIGH SENSITIVE (CARDIAC); Future    4. IFG (impaired fasting glucose)-as above.  A1C in 2017 was 5.8%. Under good control. Continue same regimen of high protein, low carb diet. Plan to evaluate with:    - HEMOGLOBIN A1C; Future    5. Vitamin D deficiency disease-as above.  Unknown control. Continue same regimen of Vitamin D supplementation. Plan to evaluate with:    - VITAMIN D,25 HYDROXY; Future    6. Vitamin B 12 deficiency-recheck labs.  Unknown control at this time.   . Continue same regimen of Vitamin B12 supplementation. Plan to evaluate with:    - VITAMIN B12; Future  - FOLATE; Future    7. Screen for STD (sexually transmitted disease)  History of cold sores. Patient would like to be screened for other STDs. Plan to evaluate with:    - HIV ANTIBODIES; Future  - HSV I/II IGG & IGM SERUM; Future    8. History of cold sores- resolved with valtrex  Cold sore from one month. Alleviated with course of Valacyclovir.    - valacyclovir (VALTREX) 1 GM Tab; Take 1 Tab by mouth 2 times a day for 11 days.  Dispense: 20 Tab; Refill: 0      40 minute face-to-face encounter took place today.  More than half of this time was spent in the coordination of care of the above problems, as well as counseling.     Eladio LEWIS (Scrsara), am scribing for, and in the presence of, Jose Luis aMce M.D..    Electronically signed by: Eladio Dias (Juan), 4/18/2019    Jose Luis LEWIS  Nakita HOLBROOK, personally performed the services described in this documentation, as scribed by Eladio Dias in my presence, and it is both accurate and complete.

## 2019-05-02 ENCOUNTER — APPOINTMENT (OUTPATIENT)
Dept: MEDICAL GROUP | Age: 54
End: 2019-05-02
Payer: COMMERCIAL

## 2019-05-02 LAB — HBA1C MFR BLD: 5.6 % (ref 0–5.6)

## 2019-05-07 ENCOUNTER — OFFICE VISIT (OUTPATIENT)
Dept: MEDICAL GROUP | Age: 54
End: 2019-05-07
Payer: COMMERCIAL

## 2019-05-07 VITALS
SYSTOLIC BLOOD PRESSURE: 128 MMHG | OXYGEN SATURATION: 97 % | DIASTOLIC BLOOD PRESSURE: 86 MMHG | HEIGHT: 72 IN | BODY MASS INDEX: 25 KG/M2 | TEMPERATURE: 98.6 F | HEART RATE: 91 BPM | WEIGHT: 184.6 LBS

## 2019-05-07 DIAGNOSIS — F32.9 REACTIVE DEPRESSION (SITUATIONAL): ICD-10-CM

## 2019-05-07 DIAGNOSIS — Z00.00 ROUTINE GENERAL MEDICAL EXAMINATION AT A HEALTH CARE FACILITY: ICD-10-CM

## 2019-05-07 DIAGNOSIS — F51.01 PRIMARY INSOMNIA: ICD-10-CM

## 2019-05-07 DIAGNOSIS — E78.2 MIXED HYPERLIPIDEMIA: ICD-10-CM

## 2019-05-07 DIAGNOSIS — Z86.19 HISTORY OF COLD SORES: ICD-10-CM

## 2019-05-07 DIAGNOSIS — E55.9 VITAMIN D DEFICIENCY: ICD-10-CM

## 2019-05-07 DIAGNOSIS — R73.01 IFG (IMPAIRED FASTING GLUCOSE): ICD-10-CM

## 2019-05-07 PROCEDURE — 99396 PREV VISIT EST AGE 40-64: CPT | Performed by: INTERNAL MEDICINE

## 2019-05-07 ASSESSMENT — ENCOUNTER SYMPTOMS
PSYCHIATRIC NEGATIVE: 1
DEPRESSION: 0
CONSTITUTIONAL NEGATIVE: 1
RESPIRATORY NEGATIVE: 1
GASTROINTESTINAL NEGATIVE: 1
CARDIOVASCULAR NEGATIVE: 1
NEUROLOGICAL NEGATIVE: 1

## 2019-05-07 NOTE — PROGRESS NOTES
Subjective:      Joseph Gray is a 53 y.o. male who presents with Follow-Up        HPI  The patient is here for followup of chronic medical problems listed below. The patient is compliant with medications and having no side effects from them. Denies chest pain, abdominal pain, dyspnea, myalgias, or cough.    Routine general medical examination at a health care facility  Patient is doing well without acute medical concerns today.     Mixed hyperlipidemia  LDL was 120. HDL was 61. Total cholesterol was within normal limits. Patient manages cholesterol levels through self efforts. He is working on diet modification and regular daily cardiovascular exercise.     Vitamin D deficiency  Patient has history of low vitamin D. Patient is taking multivitamin and spends an adequate amount of time outside.     IFG (impaired fasting glucose)  Patient had elevated fasting glucose of 102 on most recent labs. He is working on diet modification. Patient does not take any medications.     Primary insomnia  Stable, under good control without medications.     Reactive depression (situational)- prolonged, lost wife 2013  Patient lost his wife in 2013. He has been able to manage his depression well without medications. Patient states time has gradually helped him through the process. Patient denies any suicidal ideation.     History of cold sores- resolved with valtrex  Patient has a history of intermittent cold sores which have responded well and resolved with Valtrex. He is asymptomatic today.             Patient Active Problem List   Diagnosis   • Anxiety   • Vitamin D deficiency   • Mixed hyperlipidemia   • Eczema   • Intracranial hypotension- idiopathic, intermittent, orthostatic; no rx except bedrest; no blood patch ever done   • Sebaceous cyst of right axilla   • Reactive depression (situational)- prolonged, lost wife 2013   • Primary insomnia   • IFG (impaired fasting glucose)   • Lumbar disc herniation with radiculopathy-  ref neurosurgery   • Vitamin B 12 deficiency   • Lip lesions- resolved   • Burning tongue syndrome   • History of cold sores- resolved with valtrex       No outpatient prescriptions prior to visit.     No facility-administered medications prior to visit.         No Known Allergies    Review of Systems   Constitutional: Negative.    Respiratory: Negative.    Cardiovascular: Negative.    Gastrointestinal: Negative.    Neurological: Negative.    Psychiatric/Behavioral: Negative.  Negative for depression.   All other systems reviewed and are negative.       Objective:     /86 (BP Location: Right arm, Patient Position: Sitting, BP Cuff Size: Adult)   Pulse 91   Temp 37 °C (98.6 °F) (Temporal)   Ht 1.829 m (6')   Wt 83.7 kg (184 lb 9.6 oz)   SpO2 97%   BMI 25.04 kg/m²     Physical Exam   Constitutional: Oriented to person, place, and time. Appears well-developed and well-nourished. No distress.   Head: Normocephalic and atraumatic.   Right Ear: External ear normal.   Left Ear: External ear normal.   Nose: Nose normal.   Mouth/Throat: Oropharynx is clear and moist. No oropharyngeal exudate.   Eyes: Pupils are equal, round, and reactive to light. Conjunctivae and EOM are normal. Right eye exhibits no discharge. Left eye exhibits no discharge. No scleral icterus.   Neck: Normal range of motion. Neck supple. No JVD present. No tracheal deviation present. No thyromegaly present.   Cardiovascular: Normal rate, regular rhythm, normal heart sounds and intact distal pulses.  Exam reveals no gallop and no friction rub.    No murmur heard.  Pulmonary/Chest: Effort normal. No stridor. No respiratory distress. No wheezing or rales. No tenderness.   Abdominal: Soft. Bowel sounds are normal. No distension and no mass. There is no tenderness. There is no rebound and no guarding. No hernia.   Musculoskeletal: Normal range of motion No edema or tenderness.   Lymphadenopathy: No cervical adenopathy.   Neurological: Alert and  oriented to person, place, and time. Normal reflexes. Normal reflexes. No cranial nerve deficit. Normal muscle tone. Coordination normal.   Skin: Skin is warm and dry. No rash noted. Not diaphoretic. No erythema. No pallor.   Psychiatric: Normal mood and affect. Behavior is normal. Judgment and thought content normal.   Nursing note and vitals reviewed.      Lab Results   Component Value Date/Time    HBA1C 5.8 04/11/2017    HBA1C 5.7 (H) 04/18/2014 09:01 AM     Lab Results   Component Value Date/Time    SODIUM 140 04/18/2014 09:01 AM    POTASSIUM 3.9 04/18/2014 09:01 AM    CHLORIDE 104 04/18/2014 09:01 AM    CO2 25 04/18/2014 09:01 AM    GLUCOSE 111 (H) 04/18/2014 09:01 AM    BUN 14 04/18/2014 09:01 AM    CREATININE 1.23 04/18/2014 09:01 AM    ALKPHOSPHAT 48 04/18/2014 09:01 AM    ASTSGOT 18 04/18/2014 09:01 AM    ALTSGPT 18 04/18/2014 09:01 AM    TBILIRUBIN 0.9 04/18/2014 09:01 AM     No results found for: INR  Lab Results   Component Value Date/Time    CHOLSTRLTOT 202 (H) 04/18/2014 09:01 AM     (H) 04/18/2014 09:01 AM    HDL 61 04/18/2014 09:01 AM    TRIGLYCERIDE 59 04/18/2014 09:01 AM       No results found for: TESTOSTERONE  No results found for: TSH  Lab Results   Component Value Date/Time    FREET4 0.93 12/03/2013 08:18 AM     No results found for: URICACID  No components found for: VITB12  Lab Results   Component Value Date/Time    25HYDROXY 33 04/18/2014 09:01 AM    25HYDROXY 30 12/03/2013 08:18 AM          Assessment/Plan:       1. Routine general medical examination at a health care facility  - CBC WITH DIFFERENTIAL; Future  - Lipid Profile; Future  - Comp Metabolic Panel; Future    2. Mixed hyperlipidemia  Under good control. Patient manages cholesterol levels through self efforts. He undergoes regular cardiovascular exercise. Continue same regimen.   - Advised to eat low fat, low carbohydrate and high fiber diet as well as do cardio physical exercise regularly.      3. Vitamin D deficiency  Under  good control. Continue same regimen.      4. IFG (impaired fasting glucose)  Under good control. Patient manages through self efforts. Continue same regimen.    - diet/exercise/lose 15 lbs.; patient counseled     5. Primary insomnia  Under good control. Continue same regimen.     6. Reactive (situational)- prolonged, lost wife 2013  Under good control without medications. Patient offered referral to see therapist but states he feels well and wishes to continue managing through self efforts.     7. History of cold sores- resolved with valtrex   Under good control. Cold sores resolved with treatment with Valtrex.       40 minute face-to-face encounter took place today.  More than half of this time was spent in the coordination of care of the above problems, as well as counseling.     ISunny (Scribe), am scribing for, and in the presence of, Jose Luis Mace M.D..    Electronically signed by: Sunny Teixeira (Scribe), 5/7/2019    I, Jose Luis Mace M.D., personally performed the services described in this documentation, as scribed by Sunny Teixeira in my presence, and it is both accurate and complete.

## 2019-05-16 ENCOUNTER — TELEPHONE (OUTPATIENT)
Dept: DERMATOLOGY | Facility: IMAGING CENTER | Age: 54
End: 2019-05-16

## 2019-05-16 NOTE — TELEPHONE ENCOUNTER
Patient still having sensitive and some redness on lips . No improvement . Feels like bad sun burn , completed the course the valtrex . Please advise

## 2019-05-17 ENCOUNTER — TELEPHONE (OUTPATIENT)
Dept: DERMATOLOGY | Facility: IMAGING CENTER | Age: 54
End: 2019-05-17

## 2019-05-20 ENCOUNTER — OFFICE VISIT (OUTPATIENT)
Dept: DERMATOLOGY | Facility: IMAGING CENTER | Age: 54
End: 2019-05-20
Payer: COMMERCIAL

## 2019-05-20 DIAGNOSIS — L98.9 DISORDER OF THE SKIN AND SUBCUTANEOUS TISSUE, UNSPECIFIED: ICD-10-CM

## 2019-05-20 PROCEDURE — 99212 OFFICE O/P EST SF 10 MIN: CPT | Performed by: DERMATOLOGY

## 2019-05-20 RX ORDER — BETAMETHASONE DIPROPIONATE 0.05 %
1 OINTMENT (GRAM) TOPICAL 2 TIMES DAILY
Qty: 15 G | Refills: 1 | Status: SHIPPED | OUTPATIENT
Start: 2019-05-20 | End: 2020-06-04

## 2019-05-20 ASSESSMENT — ENCOUNTER SYMPTOMS
FEVER: 0
CHILLS: 0

## 2019-05-20 NOTE — PROGRESS NOTES
Dermatology Return Patient Visit    Chief Complaint   Patient presents with   • Follow-Up   • Skin Lesion       Subjective:     HPI:   Joseph Gray is a 53 y.o. male presenting for    Follow up, problem on lower lip  Initially improved with prednisone/valtrex  Has come back several times since last seen  Is semi-sore when flares up, then heals over  Seems to be aggravated by spicy foods, ?sun  Using lip balm - ?chemical blocker - aquaphor, blistex  No h/o smoking/chewing tobacco  Had no h/o HSV prior to recently suspected outbreak    History of skin cancer: No  History of biopsies:Yes, Details:  Mole removed on thigh 6 years ago benign   History of blistering/severe sunburns:Yes, Details: several during youth  Family history of skin cancer:No  Family history of atypical moles:No  Would like a full body exam 2/2 does not keep an eye on his back        No past medical history on file.    No current outpatient prescriptions on file prior to visit.     No current facility-administered medications on file prior to visit.        No Known Allergies    Family History   Problem Relation Age of Onset   • Hypertension Father    • Cancer Father        Social History     Social History   • Marital status:      Spouse name: N/A   • Number of children: N/A   • Years of education: N/A     Occupational History   • Not on file.     Social History Main Topics   • Smoking status: Never Smoker   • Smokeless tobacco: Never Used   • Alcohol use 0.0 - 4.2 oz/week      Comment: 1-3 drinks nightly   • Drug use: No   • Sexual activity: Yes     Partners: Female     Other Topics Concern   • Not on file     Social History Narrative   • No narrative on file       Review of Systems   Constitutional: Negative for chills and fever.   Skin: Positive for rash. Negative for itching.   All other systems reviewed and are negative.       Objective:     A focused cutaneous exam was completed including: hair, ears, face, eyelids, conjunctiva,  lips, gums/tongue/oropharynx, neck, left and right upper extremities (including hands/digits and fingernails) with the following pertinent findings listed below. Remaining above-listed examined areas within normal limits / negative for rashes or lesions.    There were no vitals taken for this visit.    Physical Exam   Constitutional: He is oriented to person, place, and time and well-developed, well-nourished, and in no distress.   HENT:   Head: Normocephalic and atraumatic.       Right Ear: External ear normal.   Left Ear: External ear normal.   Nose: Nose normal.   Eyes: Conjunctivae and lids are normal.   Neck: Normal range of motion. Neck supple.   Pulmonary/Chest: Effort normal.   Neurological: He is alert and oriented to person, place, and time.   Skin: Skin is warm and dry.   Psychiatric: Mood and affect normal.       DATA: none applicable to review    Assessment and Plan:     1. Disorder of the skin and subcutaneous tissue, unspecified  Comment: ddx inflammatory ?LP vs neoplastic (AK) vs less likely HSV (had no h/o this prior to last seen)  - instructed to start betamethasone 0.05% ointment to affected area of the lip twice daily for 2 weeks. Patient instructed to avoid face, axilla, and groin area. Side effects discussed, including skin thinning, appearance of stretch marks (striae), easy bruising, telangiectasias, and possible increased hair growth   - discussed importance of regular sun protection/sunscreen use, SPF 30 or greater with broad spectrum coverage, need for reapplication every  minutes  - if no improvement, bx next visit    Followup: Return in about 4 weeks (around 6/17/2019).    Kassidy Cody M.D.

## 2019-06-18 ENCOUNTER — OFFICE VISIT (OUTPATIENT)
Dept: DERMATOLOGY | Facility: IMAGING CENTER | Age: 54
End: 2019-06-18
Payer: COMMERCIAL

## 2019-06-18 DIAGNOSIS — L98.9 DISORDER OF THE SKIN AND SUBCUTANEOUS TISSUE, UNSPECIFIED: ICD-10-CM

## 2019-06-18 PROCEDURE — 99211 OFF/OP EST MAY X REQ PHY/QHP: CPT | Performed by: DERMATOLOGY

## 2019-06-18 ASSESSMENT — ENCOUNTER SYMPTOMS
FEVER: 0
CHILLS: 0

## 2019-06-18 NOTE — PROGRESS NOTES
Dermatology Return Patient Visit    Chief Complaint   Patient presents with   • Follow-Up       Subjective:     HPI:   Joseph Gray is a 53 y.o. male presenting for    Follow up - rash/lesion on lower lip  Pt used betamethasone for 2 weeks BID and then stopped, seems to be improved  Currently using bag balm & zinc oxide sunscreen  No recent flares, open sores  No new lesions of concern    History of skin cancer: No  History of biopsies:Yes, Details:  Mole removed on thigh 6 years ago benign   History of blistering/severe sunburns:Yes, Details: several during youth  Family history of skin cancer:No  Family history of atypical moles:No  Would like a full body exam 2/2 does not keep an eye on his back        No past medical history on file.    Current Outpatient Prescriptions on File Prior to Visit   Medication Sig Dispense Refill   • betamethasone dipropionate (DIPROLENE) 0.05 % Ointment Apply 1 Application to affected area(s) 2 Times a Day. To area on lip 15 g 1     No current facility-administered medications on file prior to visit.        No Known Allergies    Family History   Problem Relation Age of Onset   • Hypertension Father    • Cancer Father        Social History     Social History   • Marital status:      Spouse name: N/A   • Number of children: N/A   • Years of education: N/A     Occupational History   • Not on file.     Social History Main Topics   • Smoking status: Never Smoker   • Smokeless tobacco: Never Used   • Alcohol use 0.0 - 4.2 oz/week      Comment: 1-3 drinks nightly   • Drug use: No   • Sexual activity: Yes     Partners: Female     Other Topics Concern   • Not on file     Social History Narrative   • No narrative on file       Review of Systems   Constitutional: Negative for chills and fever.   Skin: Negative for itching and rash.   All other systems reviewed and are negative.       Objective:     A focused cutaneous exam was completed including: hair, ears, face, eyelids,  conjunctiva, lips with the following pertinent findings listed below. Remaining above-listed examined areas within normal limits / negative for rashes or lesions.    There were no vitals taken for this visit.    Physical Exam   Constitutional: He is oriented to person, place, and time and well-developed, well-nourished, and in no distress.   HENT:   Head: Normocephalic and atraumatic.       Right Ear: External ear normal.   Left Ear: External ear normal.   Nose: Nose normal.   Eyes: Conjunctivae and lids are normal.   Neck: Normal range of motion. Neck supple.   Pulmonary/Chest: Effort normal.   Neurological: He is alert and oriented to person, place, and time.   Skin: Skin is warm and dry.   Psychiatric: Mood and affect normal.       DATA: none applicable to review    Assessment and Plan:     1. Disorder of the skin and subcutaneous tissue, unspecified; improved  Comment: ddx inflammatory ?LP vs less likely neoplastic (AK) vs unlikely HSV (had no h/o this prior to last seen)  - can cont betamethasone 0.05% ointment to affected area of the lip twice daily prn. Patient instructed to avoid face, axilla, and groin area. Side effects discussed, including skin thinning, appearance of stretch marks (striae), easy bruising, telangiectasias, and possible increased hair growth   - discussed continued importance of regular sun protection/sunscreen use, SPF 30 or greater with broad spectrum coverage, need for reapplication every  minutes  - if recurs/worsens, consider 5FU vs biopsy     Followup: Return if symptoms worsen or fail to improve.    Kassidy Cody M.D.

## 2020-05-08 DIAGNOSIS — Z11.59 NEED FOR HEPATITIS C SCREENING TEST: ICD-10-CM

## 2020-05-08 DIAGNOSIS — E78.2 MIXED HYPERLIPIDEMIA: ICD-10-CM

## 2020-05-08 DIAGNOSIS — E53.8 VITAMIN B 12 DEFICIENCY: ICD-10-CM

## 2020-05-08 DIAGNOSIS — R73.01 IFG (IMPAIRED FASTING GLUCOSE): ICD-10-CM

## 2020-05-08 DIAGNOSIS — Z12.5 SCREENING FOR PROSTATE CANCER: ICD-10-CM

## 2020-05-08 DIAGNOSIS — I10 ESSENTIAL HYPERTENSION: ICD-10-CM

## 2020-05-08 DIAGNOSIS — E55.9 VITAMIN D DEFICIENCY DISEASE: ICD-10-CM

## 2020-05-08 DIAGNOSIS — E55.9 VITAMIN D DEFICIENCY: ICD-10-CM

## 2020-05-08 DIAGNOSIS — Z00.00 HEALTHCARE MAINTENANCE: ICD-10-CM

## 2020-05-19 DIAGNOSIS — Z20.822 EXPOSURE TO COVID-19 VIRUS: ICD-10-CM

## 2020-05-23 LAB
25(OH)D3+25(OH)D2 SERPL-MCNC: 70.9 NG/ML (ref 30–100)
ALBUMIN SERPL-MCNC: 4.3 G/DL (ref 3.8–4.9)
ALBUMIN/GLOB SERPL: 2 {RATIO} (ref 1.2–2.2)
ALP SERPL-CCNC: 48 IU/L (ref 39–117)
ALT SERPL-CCNC: 25 IU/L (ref 0–44)
AST SERPL-CCNC: 22 IU/L (ref 0–40)
BASOPHILS # BLD AUTO: 0 X10E3/UL (ref 0–0.2)
BASOPHILS NFR BLD AUTO: 1 %
BILIRUB SERPL-MCNC: 0.6 MG/DL (ref 0–1.2)
BUN SERPL-MCNC: 20 MG/DL (ref 6–24)
BUN/CREAT SERPL: 22 (ref 9–20)
CALCIUM SERPL-MCNC: 8.8 MG/DL (ref 8.7–10.2)
CHLORIDE SERPL-SCNC: 105 MMOL/L (ref 96–106)
CHOLEST SERPL-MCNC: 193 MG/DL (ref 100–199)
CO2 SERPL-SCNC: 20 MMOL/L (ref 20–29)
CREAT SERPL-MCNC: 0.93 MG/DL (ref 0.76–1.27)
EOSINOPHIL # BLD AUTO: 0.1 X10E3/UL (ref 0–0.4)
EOSINOPHIL NFR BLD AUTO: 2 %
ERYTHROCYTE [DISTWIDTH] IN BLOOD BY AUTOMATED COUNT: 12.7 % (ref 11.6–15.4)
FOLATE SERPL-MCNC: >19.9 NG/ML
GLOBULIN SER CALC-MCNC: 2.2 G/DL (ref 1.5–4.5)
GLUCOSE SERPL-MCNC: 110 MG/DL (ref 65–99)
HBA1C MFR BLD: 5.5 % (ref 4.8–5.6)
HCT VFR BLD AUTO: 41.8 % (ref 37.5–51)
HCV AB S/CO SERPL IA: <0.1 S/CO RATIO (ref 0–0.9)
HDLC SERPL-MCNC: 60 MG/DL
HGB BLD-MCNC: 14.3 G/DL (ref 13–17.7)
IMM GRANULOCYTES # BLD AUTO: 0 X10E3/UL (ref 0–0.1)
IMM GRANULOCYTES NFR BLD AUTO: 0 %
IMMATURE CELLS  115398: NORMAL
LABORATORY COMMENT REPORT: ABNORMAL
LDLC SERPL CALC-MCNC: 123 MG/DL (ref 0–99)
LYMPHOCYTES # BLD AUTO: 1.6 X10E3/UL (ref 0.7–3.1)
LYMPHOCYTES NFR BLD AUTO: 43 %
MCH RBC QN AUTO: 32.4 PG (ref 26.6–33)
MCHC RBC AUTO-ENTMCNC: 34.2 G/DL (ref 31.5–35.7)
MCV RBC AUTO: 95 FL (ref 79–97)
MONOCYTES # BLD AUTO: 0.4 X10E3/UL (ref 0.1–0.9)
MONOCYTES NFR BLD AUTO: 10 %
MORPHOLOGY BLD-IMP: NORMAL
NEUTROPHILS # BLD AUTO: 1.6 X10E3/UL (ref 1.4–7)
NEUTROPHILS NFR BLD AUTO: 44 %
NRBC BLD AUTO-RTO: NORMAL %
PLATELET # BLD AUTO: 198 X10E3/UL (ref 150–450)
POTASSIUM SERPL-SCNC: 5 MMOL/L (ref 3.5–5.2)
PROT SERPL-MCNC: 6.5 G/DL (ref 6–8.5)
PSA SERPL-MCNC: 0.4 NG/ML (ref 0–4)
RBC # BLD AUTO: 4.42 X10E6/UL (ref 4.14–5.8)
SARS-COV-2 IGG SERPL QL IA: NEGATIVE
SODIUM SERPL-SCNC: 138 MMOL/L (ref 134–144)
TRIGL SERPL-MCNC: 48 MG/DL (ref 0–149)
TSH SERPL DL<=0.005 MIU/L-ACNC: 2.64 UIU/ML (ref 0.45–4.5)
VIT B12 SERPL-MCNC: 459 PG/ML (ref 232–1245)
VLDLC SERPL CALC-MCNC: 10 MG/DL (ref 5–40)
WBC # BLD AUTO: 3.6 X10E3/UL (ref 3.4–10.8)

## 2020-06-04 ENCOUNTER — OFFICE VISIT (OUTPATIENT)
Dept: MEDICAL GROUP | Age: 55
End: 2020-06-04
Payer: OTHER MISCELLANEOUS

## 2020-06-04 VITALS
DIASTOLIC BLOOD PRESSURE: 80 MMHG | WEIGHT: 181.4 LBS | SYSTOLIC BLOOD PRESSURE: 114 MMHG | HEART RATE: 92 BPM | HEIGHT: 72 IN | OXYGEN SATURATION: 96 % | BODY MASS INDEX: 24.57 KG/M2 | TEMPERATURE: 98.1 F

## 2020-06-04 DIAGNOSIS — I10 ESSENTIAL HYPERTENSION: ICD-10-CM

## 2020-06-04 DIAGNOSIS — E78.2 MIXED HYPERLIPIDEMIA: ICD-10-CM

## 2020-06-04 DIAGNOSIS — Z00.00 HEALTHCARE MAINTENANCE: ICD-10-CM

## 2020-06-04 DIAGNOSIS — R73.01 IFG (IMPAIRED FASTING GLUCOSE): ICD-10-CM

## 2020-06-04 PROCEDURE — 99396 PREV VISIT EST AGE 40-64: CPT | Performed by: INTERNAL MEDICINE

## 2020-06-04 ASSESSMENT — ENCOUNTER SYMPTOMS
CARDIOVASCULAR NEGATIVE: 1
GASTROINTESTINAL NEGATIVE: 1
RESPIRATORY NEGATIVE: 1
CONSTITUTIONAL NEGATIVE: 1
PSYCHIATRIC NEGATIVE: 1
NEUROLOGICAL NEGATIVE: 1
MUSCULOSKELETAL NEGATIVE: 1
EYES NEGATIVE: 1

## 2020-06-04 ASSESSMENT — FIBROSIS 4 INDEX: FIB4 SCORE: 1.244444444444444444

## 2020-06-04 NOTE — PROGRESS NOTES
Subjective:      Joseph Gray is a 54 y.o. male who presents with Annual Exam    And  The patient is here for followup of chronic medical problems listed below. The patient is compliant with medications and having no side effects from them. Denies chest pain, abdominal pain, dyspnea, myalgias, or cough.   Patient Active Problem List    Diagnosis Date Noted   • Vitamin B 12 deficiency 04/18/2019   • Lip lesions- resolved 04/18/2019   • Burning tongue syndrome 04/18/2019   • History of cold sores- resolved with valtrex 04/18/2019   • Lumbar disc herniation with radiculopathy- ref neurosurgery 02/03/2017   • Reactive depression (situational)- prolonged, lost wife 2013 01/20/2017   • Primary insomnia 01/20/2017   • IFG (impaired fasting glucose) 01/20/2017   • Sebaceous cyst of right axilla 02/10/2016   • Eczema 03/19/2015   • Intracranial hypotension- idiopathic, intermittent, orthostatic; no rx except bedrest; no blood patch ever done 03/19/2015   • Vitamin D deficiency 12/04/2013   • Mixed hyperlipidemia 12/04/2013   • Anxiety 11/06/2013     No Known Allergies  Outpatient Medications Prior to Visit   Medication Sig Dispense Refill   • betamethasone dipropionate (DIPROLENE) 0.05 % Ointment Apply 1 Application to affected area(s) 2 Times a Day. To area on lip (Patient not taking: Reported on 6/4/2020) 15 g 1     No facility-administered medications prior to visit.              HPI    Review of Systems   Constitutional: Negative.    HENT: Negative.    Eyes: Negative.    Respiratory: Negative.    Cardiovascular: Negative.    Gastrointestinal: Negative.    Genitourinary: Negative.    Musculoskeletal: Negative.    Skin: Negative.    Neurological: Negative.    Endo/Heme/Allergies: Negative.    Psychiatric/Behavioral: Negative.           Objective:     /80 (BP Location: Left arm, Patient Position: Sitting, BP Cuff Size: Adult)   Pulse 92   Temp 36.7 °C (98.1 °F) (Temporal)   Ht 1.829 m (6')   Wt 82.3 kg (181  lb 6.4 oz)   SpO2 96%   BMI 24.60 kg/m²      Physical Exam  Constitutional:       General: He is not in acute distress.     Appearance: He is well-developed. He is not diaphoretic.   HENT:      Head: Normocephalic and atraumatic.      Right Ear: External ear normal.      Left Ear: External ear normal.      Nose: Nose normal.      Mouth/Throat:      Pharynx: No oropharyngeal exudate.   Eyes:      General: No scleral icterus.        Right eye: No discharge.         Left eye: No discharge.      Conjunctiva/sclera: Conjunctivae normal.      Pupils: Pupils are equal, round, and reactive to light.   Neck:      Musculoskeletal: Normal range of motion and neck supple.      Thyroid: No thyromegaly.      Vascular: No JVD.      Trachea: No tracheal deviation.   Cardiovascular:      Rate and Rhythm: Normal rate and regular rhythm.      Heart sounds: Normal heart sounds. No murmur. No friction rub. No gallop.    Pulmonary:      Effort: Pulmonary effort is normal. No respiratory distress.      Breath sounds: Normal breath sounds. No stridor. No wheezing or rales.   Chest:      Chest wall: No tenderness.   Abdominal:      General: Bowel sounds are normal. There is no distension.      Palpations: Abdomen is soft. There is no mass.      Tenderness: There is no abdominal tenderness. There is no guarding or rebound.   Musculoskeletal: Normal range of motion.         General: No tenderness.   Lymphadenopathy:      Cervical: No cervical adenopathy.   Skin:     General: Skin is warm and dry.      Coloration: Skin is not pale.      Findings: No erythema or rash.   Neurological:      Mental Status: He is alert and oriented to person, place, and time.      Motor: No abnormal muscle tone.      Coordination: Coordination normal.      Deep Tendon Reflexes: Reflexes are normal and symmetric. Reflexes normal.   Psychiatric:         Behavior: Behavior normal.         Thought Content: Thought content normal.         Judgment: Judgment normal.        Orders Only on 05/22/2020   Component Date Value   • WBC 05/22/2020 3.6    • RBC 05/22/2020 4.42    • Hemoglobin 05/22/2020 14.3    • Hematocrit 05/22/2020 41.8    • MCV 05/22/2020 95    • MCH 05/22/2020 32.4    • MCHC 05/22/2020 34.2    • RDW 05/22/2020 12.7    • Platelet Count 05/22/2020 198    • Neutrophils-Polys 05/22/2020 44    • Lymphocytes 05/22/2020 43    • Monocytes 05/22/2020 10    • Eosinophils 05/22/2020 2    • Basophils 05/22/2020 1    • Immature Cells 05/22/2020 CANCELED    • Neutrophils (Absolute) 05/22/2020 1.6    • Lymphs (Absolute) 05/22/2020 1.6    • Monos (Absolute) 05/22/2020 0.4    • Eos (Absolute) 05/22/2020 0.1    • Baso (Absolute) 05/22/2020 0.0    • Immature Granulocytes 05/22/2020 0    • Immature Granulocytes (a* 05/22/2020 0.0    • Nucleated RBC 05/22/2020 CANCELED    • Comments-Diff 05/22/2020 CANCELED    • Glucose 05/22/2020 110*   • Bun 05/22/2020 20    • Creatinine 05/22/2020 0.93    • GFR If Non  Ameri* 05/22/2020 91    • GFR If  05/22/2020 106    • Bun-Creatinine Ratio 05/22/2020 22*   • Sodium 05/22/2020 138    • Potassium 05/22/2020 5.0    • Chloride 05/22/2020 105    • Co2 05/22/2020 20    • Calcium 05/22/2020 8.8    • Total Protein 05/22/2020 6.5    • Albumin 05/22/2020 4.3    • Globulin 05/22/2020 2.2    • A-G Ratio 05/22/2020 2.0    • Total Bilirubin 05/22/2020 0.6    • Alkaline Phosphatase 05/22/2020 48    • AST(SGOT) 05/22/2020 22    • ALT(SGPT) 05/22/2020 25    • Cholesterol,Tot 05/22/2020 193    • Triglycerides 05/22/2020 48    • HDL 05/22/2020 60    • VLDL Cholesterol Calc 05/22/2020 10    • LDL 05/22/2020 123*   • Comment: 05/22/2020 CANCELED    • Glycohemoglobin 05/22/2020 5.5    • Folate -Folic Acid 05/22/2020 >19.9    • TSH 05/22/2020 2.640    • Prostatic Specific Antig* 05/22/2020 0.4    • 25-Hydroxy   Vitamin D 25 05/22/2020 70.9    • Hepatitis C Antibody 05/22/2020 <0.1    • SARS-CoV-2 Antibody, IgG 05/22/2020 Negative    • Vitamin B12  -True Cobala* 05/22/2020 459       Lab Results   Component Value Date/Time    HBA1C 5.5 05/22/2020 06:44 AM    HBA1C 5.6 05/02/2019    HBA1C 5.8 04/11/2017     Lab Results   Component Value Date/Time    SODIUM 138 05/22/2020 06:44 AM    SODIUM 140 04/18/2014 09:01 AM    POTASSIUM 5.0 05/22/2020 06:44 AM    POTASSIUM 3.9 04/18/2014 09:01 AM    CHLORIDE 105 05/22/2020 06:44 AM    CHLORIDE 104 04/18/2014 09:01 AM    CO2 20 05/22/2020 06:44 AM    CO2 25 04/18/2014 09:01 AM    GLUCOSE 110 (H) 05/22/2020 06:44 AM    GLUCOSE 111 (H) 04/18/2014 09:01 AM    BUN 20 05/22/2020 06:44 AM    BUN 14 04/18/2014 09:01 AM    CREATININE 0.93 05/22/2020 06:44 AM    CREATININE 1.23 04/18/2014 09:01 AM    BUNCREATRAT 22 (H) 05/22/2020 06:44 AM    ALKPHOSPHAT 48 05/22/2020 06:44 AM    ALKPHOSPHAT 48 04/18/2014 09:01 AM    ASTSGOT 22 05/22/2020 06:44 AM    ASTSGOT 18 04/18/2014 09:01 AM    ALTSGPT 25 05/22/2020 06:44 AM    ALTSGPT 18 04/18/2014 09:01 AM    TBILIRUBIN 0.6 05/22/2020 06:44 AM    TBILIRUBIN 0.9 04/18/2014 09:01 AM     No results found for: INR  Lab Results   Component Value Date/Time    CHOLSTRLTOT 193 05/22/2020 06:44 AM    CHOLSTRLTOT 202 (H) 04/18/2014 09:01 AM     (H) 05/22/2020 06:44 AM     (H) 04/18/2014 09:01 AM    HDL 60 05/22/2020 06:44 AM    HDL 61 04/18/2014 09:01 AM    TRIGLYCERIDE 48 05/22/2020 06:44 AM    TRIGLYCERIDE 59 04/18/2014 09:01 AM       No results found for: TESTOSTERONE  Lab Results   Component Value Date/Time    TSH 2.640 05/22/2020 06:44 AM     Lab Results   Component Value Date/Time    FREET4 0.93 12/03/2013 08:18 AM     No results found for: URICACID  No components found for: VITB12  Lab Results   Component Value Date/Time    25HYDROXY 70.9 05/22/2020 06:44 AM    25HYDROXY 33 04/18/2014 09:01 AM    25HYDROXY 30 12/03/2013 08:18 AM                 Assessment/Plan:       1. Healthcare maintenance   normal exam  - Comp Metabolic Panel; Future  - Lipid Profile; Future  - CBC WITH  DIFFERENTIAL; Future  - HEMOGLOBIN A1C; Future    2. IFG (impaired fasting glucose)    Under good control. Continue same regimen.    - HEMOGLOBIN A1C; Future    3. Mixed hyperlipidemia   Under good control. Continue same regimen.      4. Essential hypertension       Under good control. Continue same regimen.

## 2020-07-24 ENCOUNTER — OFFICE VISIT (OUTPATIENT)
Dept: URGENT CARE | Facility: CLINIC | Age: 55
End: 2020-07-24
Payer: OTHER MISCELLANEOUS

## 2020-07-24 VITALS
OXYGEN SATURATION: 97 % | RESPIRATION RATE: 16 BRPM | DIASTOLIC BLOOD PRESSURE: 84 MMHG | BODY MASS INDEX: 24.38 KG/M2 | HEIGHT: 72 IN | WEIGHT: 180 LBS | HEART RATE: 84 BPM | TEMPERATURE: 97.7 F | SYSTOLIC BLOOD PRESSURE: 136 MMHG

## 2020-07-24 DIAGNOSIS — I73.9 CLAUDICATION (HCC): ICD-10-CM

## 2020-07-24 PROCEDURE — 99214 OFFICE O/P EST MOD 30 MIN: CPT | Performed by: NURSE PRACTITIONER

## 2020-07-24 ASSESSMENT — ENCOUNTER SYMPTOMS
CHILLS: 0
FEVER: 0

## 2020-07-24 ASSESSMENT — FIBROSIS 4 INDEX: FIB4 SCORE: 1.244444444444444444

## 2020-07-24 ASSESSMENT — PAIN SCALES - GENERAL: PAINLEVEL: 4=SLIGHT-MODERATE PAIN

## 2020-07-24 NOTE — PROGRESS NOTES
Subjective:      Joseph Gray is a 54 y.o. male who presents with Other (pain and fatigue in lower legs, pain radiates to groin area)      History reviewed. No pertinent past medical history.  Social History     Socioeconomic History   • Marital status:      Spouse name: Not on file   • Number of children: Not on file   • Years of education: Not on file   • Highest education level: Not on file   Occupational History   • Not on file   Social Needs   • Financial resource strain: Not on file   • Food insecurity     Worry: Not on file     Inability: Not on file   • Transportation needs     Medical: Not on file     Non-medical: Not on file   Tobacco Use   • Smoking status: Never Smoker   • Smokeless tobacco: Never Used   Substance and Sexual Activity   • Alcohol use: Yes     Alcohol/week: 0.0 - 4.2 oz     Comment: 1-3 drinks nightly   • Drug use: No   • Sexual activity: Yes     Partners: Female   Lifestyle   • Physical activity     Days per week: Not on file     Minutes per session: Not on file   • Stress: Not on file   Relationships   • Social connections     Talks on phone: Not on file     Gets together: Not on file     Attends Restorationist service: Not on file     Active member of club or organization: Not on file     Attends meetings of clubs or organizations: Not on file     Relationship status: Not on file   • Intimate partner violence     Fear of current or ex partner: Not on file     Emotionally abused: Not on file     Physically abused: Not on file     Forced sexual activity: Not on file   Other Topics Concern   • Not on file   Social History Narrative   • Not on file     Family History   Problem Relation Age of Onset   • Hypertension Father    • Cancer Father        Allergies: Patient has no known allergies.    Patient 54-year-old male who is very physically active with complaint of bilateral leg pain and fatigue after intense activity over the last month.  States he has noted some prominence to the  lower extremity veins as well.  He is concerned about blood clots.  He has not had any leg swelling and only experiences pain after activity.        Other   This is a new problem. The problem occurs constantly. The problem has been unchanged. Pertinent negatives include no chills or fever. Nothing aggravates the symptoms. He has tried nothing for the symptoms. The treatment provided no relief.       Review of Systems   Constitutional: Negative for chills and fever.   Musculoskeletal:        Bilateral leg pain    All other systems reviewed and are negative.         Objective:     /84   Pulse 84   Temp 36.5 °C (97.7 °F)   Resp 16   Ht 1.829 m (6')   Wt 81.6 kg (180 lb)   SpO2 97%   BMI 24.41 kg/m²      Physical Exam  Vitals signs reviewed.   Constitutional:       Appearance: Normal appearance.   Musculoskeletal: Normal range of motion.      Comments: No pain or swelling in the legs at this time.  Homans sign negative.     Skin:     General: Skin is warm and dry.      Capillary Refill: Capillary refill takes less than 2 seconds.   Neurological:      General: No focal deficit present.      Mental Status: He is alert and oriented to person, place, and time.   Psychiatric:         Mood and Affect: Mood normal.         Behavior: Behavior normal.         Thought Content: Thought content normal.         Judgment: Judgment normal.       Discussed with patient that his symptoms at this time seem to be more consistent with possible claudication as he experiences pain and fatigue with intense exertion.  Patient would like to have this evaluated, and I did discuss doing bilateral arterial Doppler to assess for this problem.    Bilateral lower extremity arterial Doppler ordered with REBECCA.  Explained to patient that if he does not meet the criteria for the REBECCA that the study may not proceed, per protocol.  Patient verbalized understanding and agreement.    I also recommended to patient that he follow-up with his primary  care physician as this is been an ongoing problem for over a month.          Assessment/Plan:   Claudication    See note above  Bilateral lower extremity arterial Doppler ordered with REBECCA  We will notify patient with results  Strict ER precautions given for worsening of symptoms  Follow-up with primary care physician  Consider referral to vascular specialist pending results.    There are no diagnoses linked to this encounter.

## 2020-07-29 ENCOUNTER — HOSPITAL ENCOUNTER (OUTPATIENT)
Dept: HOSPITAL 8 - CVU | Age: 55
Discharge: HOME | End: 2020-07-29
Attending: NURSE PRACTITIONER
Payer: COMMERCIAL

## 2020-07-29 DIAGNOSIS — I73.9: Primary | ICD-10-CM

## 2020-07-29 DIAGNOSIS — R53.83: ICD-10-CM

## 2020-07-29 PROCEDURE — 93922 UPR/L XTREMITY ART 2 LEVELS: CPT

## 2020-08-06 DIAGNOSIS — I73.9 CLAUDICATION (HCC): ICD-10-CM

## 2021-03-15 DIAGNOSIS — Z23 NEED FOR VACCINATION: ICD-10-CM

## 2021-03-20 ENCOUNTER — IMMUNIZATION (OUTPATIENT)
Dept: FAMILY PLANNING/WOMEN'S HEALTH CLINIC | Facility: IMMUNIZATION CENTER | Age: 56
End: 2021-03-20
Attending: INTERNAL MEDICINE
Payer: OTHER MISCELLANEOUS

## 2021-03-20 DIAGNOSIS — Z23 ENCOUNTER FOR VACCINATION: Primary | ICD-10-CM

## 2021-03-20 DIAGNOSIS — Z23 NEED FOR VACCINATION: ICD-10-CM

## 2021-03-20 PROCEDURE — 91300 PFIZER SARS-COV-2 VACCINE: CPT | Performed by: INTERNAL MEDICINE

## 2021-03-20 PROCEDURE — 0001A PFIZER SARS-COV-2 VACCINE: CPT | Performed by: INTERNAL MEDICINE

## 2021-04-08 ENCOUNTER — IMMUNIZATION (OUTPATIENT)
Dept: FAMILY PLANNING/WOMEN'S HEALTH CLINIC | Facility: IMMUNIZATION CENTER | Age: 56
End: 2021-04-08
Payer: OTHER MISCELLANEOUS

## 2021-04-08 DIAGNOSIS — Z23 ENCOUNTER FOR VACCINATION: Primary | ICD-10-CM

## 2021-04-08 PROCEDURE — 0002A PFIZER SARS-COV-2 VACCINE: CPT

## 2021-04-08 PROCEDURE — 91300 PFIZER SARS-COV-2 VACCINE: CPT

## 2021-07-29 DIAGNOSIS — R73.01 IFG (IMPAIRED FASTING GLUCOSE): ICD-10-CM

## 2021-07-29 DIAGNOSIS — E78.2 MIXED HYPERLIPIDEMIA: ICD-10-CM

## 2021-07-29 DIAGNOSIS — Z12.5 SCREENING FOR PROSTATE CANCER: ICD-10-CM

## 2021-07-29 DIAGNOSIS — R73.01 IMPAIRED FASTING GLUCOSE: ICD-10-CM

## 2021-07-29 DIAGNOSIS — E55.9 VITAMIN D DEFICIENCY: ICD-10-CM

## 2021-07-31 LAB
25(OH)D3+25(OH)D2 SERPL-MCNC: 26.6 NG/ML (ref 30–100)
ALBUMIN SERPL-MCNC: 4.4 G/DL (ref 3.8–4.9)
ALBUMIN/GLOB SERPL: 2.1 {RATIO} (ref 1.2–2.2)
ALP SERPL-CCNC: 64 IU/L (ref 48–121)
ALT SERPL-CCNC: 22 IU/L (ref 0–44)
AST SERPL-CCNC: 18 IU/L (ref 0–40)
BASOPHILS # BLD AUTO: 0 X10E3/UL (ref 0–0.2)
BASOPHILS NFR BLD AUTO: 1 %
BILIRUB SERPL-MCNC: <0.2 MG/DL (ref 0–1.2)
BUN SERPL-MCNC: 17 MG/DL (ref 6–24)
BUN/CREAT SERPL: 15 (ref 9–20)
CALCIUM SERPL-MCNC: 9.4 MG/DL (ref 8.7–10.2)
CHLORIDE SERPL-SCNC: 103 MMOL/L (ref 96–106)
CHOLEST SERPL-MCNC: 204 MG/DL (ref 100–199)
CO2 SERPL-SCNC: 22 MMOL/L (ref 20–29)
CREAT SERPL-MCNC: 1.1 MG/DL (ref 0.76–1.27)
EOSINOPHIL # BLD AUTO: 0.1 X10E3/UL (ref 0–0.4)
EOSINOPHIL NFR BLD AUTO: 2 %
ERYTHROCYTE [DISTWIDTH] IN BLOOD BY AUTOMATED COUNT: 13.1 % (ref 11.6–15.4)
GLOBULIN SER CALC-MCNC: 2.1 G/DL (ref 1.5–4.5)
GLUCOSE SERPL-MCNC: 104 MG/DL (ref 65–99)
HBA1C MFR BLD: 5.7 % (ref 4.8–5.6)
HCT VFR BLD AUTO: 45.9 % (ref 37.5–51)
HDLC SERPL-MCNC: 46 MG/DL
HGB BLD-MCNC: 16 G/DL (ref 13–17.7)
IMM GRANULOCYTES # BLD AUTO: 0 X10E3/UL (ref 0–0.1)
IMM GRANULOCYTES NFR BLD AUTO: 0 %
IMMATURE CELLS  115398: NORMAL
LABORATORY COMMENT REPORT: ABNORMAL
LDLC SERPL CALC-MCNC: 120 MG/DL (ref 0–99)
LYMPHOCYTES # BLD AUTO: 2.8 X10E3/UL (ref 0.7–3.1)
LYMPHOCYTES NFR BLD AUTO: 52 %
MCH RBC QN AUTO: 32.5 PG (ref 26.6–33)
MCHC RBC AUTO-ENTMCNC: 34.9 G/DL (ref 31.5–35.7)
MCV RBC AUTO: 93 FL (ref 79–97)
MONOCYTES # BLD AUTO: 0.4 X10E3/UL (ref 0.1–0.9)
MONOCYTES NFR BLD AUTO: 8 %
MORPHOLOGY BLD-IMP: NORMAL
NEUTROPHILS # BLD AUTO: 2 X10E3/UL (ref 1.4–7)
NEUTROPHILS NFR BLD AUTO: 37 %
NRBC BLD AUTO-RTO: NORMAL %
PLATELET # BLD AUTO: 205 X10E3/UL (ref 150–450)
POTASSIUM SERPL-SCNC: 4.6 MMOL/L (ref 3.5–5.2)
PROT SERPL-MCNC: 6.5 G/DL (ref 6–8.5)
RBC # BLD AUTO: 4.93 X10E6/UL (ref 4.14–5.8)
SODIUM SERPL-SCNC: 139 MMOL/L (ref 134–144)
TRIGL SERPL-MCNC: 219 MG/DL (ref 0–149)
TSH SERPL DL<=0.005 MIU/L-ACNC: 4.4 UIU/ML (ref 0.45–4.5)
VLDLC SERPL CALC-MCNC: 38 MG/DL (ref 5–40)
WBC # BLD AUTO: 5.4 X10E3/UL (ref 3.4–10.8)

## 2021-09-10 LAB — PSA SERPL-MCNC: 0.4 NG/ML (ref 0–4)

## 2021-11-11 ENCOUNTER — APPOINTMENT (RX ONLY)
Dept: URBAN - METROPOLITAN AREA CLINIC 4 | Facility: CLINIC | Age: 56
Setting detail: DERMATOLOGY
End: 2021-11-11

## 2021-11-11 DIAGNOSIS — L57.8 OTHER SKIN CHANGES DUE TO CHRONIC EXPOSURE TO NONIONIZING RADIATION: ICD-10-CM

## 2021-11-11 DIAGNOSIS — L82.1 OTHER SEBORRHEIC KERATOSIS: ICD-10-CM

## 2021-11-11 DIAGNOSIS — L81.5 LEUKODERMA, NOT ELSEWHERE CLASSIFIED: ICD-10-CM

## 2021-11-11 DIAGNOSIS — L81.4 OTHER MELANIN HYPERPIGMENTATION: ICD-10-CM

## 2021-11-11 DIAGNOSIS — L90.5 SCAR CONDITIONS AND FIBROSIS OF SKIN: ICD-10-CM

## 2021-11-11 DIAGNOSIS — D22 MELANOCYTIC NEVI: ICD-10-CM

## 2021-11-11 DIAGNOSIS — D18.0 HEMANGIOMA: ICD-10-CM

## 2021-11-11 PROBLEM — D18.01 HEMANGIOMA OF SKIN AND SUBCUTANEOUS TISSUE: Status: ACTIVE | Noted: 2021-11-11

## 2021-11-11 PROBLEM — D22.5 MELANOCYTIC NEVI OF TRUNK: Status: ACTIVE | Noted: 2021-11-11

## 2021-11-11 PROCEDURE — 99203 OFFICE O/P NEW LOW 30 MIN: CPT

## 2021-11-11 PROCEDURE — ? COUNSELING

## 2021-11-11 PROCEDURE — ? ADDITIONAL NOTES

## 2021-11-11 ASSESSMENT — LOCATION DETAILED DESCRIPTION DERM
LOCATION DETAILED: LEFT MEDIAL SUPERIOR CHEST
LOCATION DETAILED: LEFT LATERAL PROXIMAL PRETIBIAL REGION
LOCATION DETAILED: RIGHT PROXIMAL DORSAL FOREARM
LOCATION DETAILED: EPIGASTRIC SKIN
LOCATION DETAILED: RIGHT SUPERIOR MEDIAL UPPER BACK
LOCATION DETAILED: LEFT LATERAL TRAPEZIAL NECK
LOCATION DETAILED: RIGHT ANTERIOR DISTAL THIGH
LOCATION DETAILED: LEFT PROXIMAL DORSAL FOREARM
LOCATION DETAILED: LEFT ANTERIOR DISTAL THIGH
LOCATION DETAILED: LEFT ANTERIOR PROXIMAL UPPER ARM
LOCATION DETAILED: LEFT INFERIOR CENTRAL MALAR CHEEK
LOCATION DETAILED: RIGHT ANTERIOR PROXIMAL UPPER ARM
LOCATION DETAILED: RIGHT CENTRAL MALAR CHEEK
LOCATION DETAILED: LEFT INFERIOR MEDIAL LOWER BACK
LOCATION DETAILED: RIGHT INFERIOR UPPER BACK

## 2021-11-11 ASSESSMENT — LOCATION ZONE DERM
LOCATION ZONE: ARM
LOCATION ZONE: TRUNK
LOCATION ZONE: NECK
LOCATION ZONE: FACE
LOCATION ZONE: LEG

## 2021-11-11 ASSESSMENT — LOCATION SIMPLE DESCRIPTION DERM
LOCATION SIMPLE: LEFT PRETIBIAL REGION
LOCATION SIMPLE: RIGHT CHEEK
LOCATION SIMPLE: LEFT UPPER ARM
LOCATION SIMPLE: LEFT FOREARM
LOCATION SIMPLE: RIGHT UPPER BACK
LOCATION SIMPLE: RIGHT UPPER ARM
LOCATION SIMPLE: POSTERIOR NECK
LOCATION SIMPLE: RIGHT FOREARM
LOCATION SIMPLE: RIGHT THIGH
LOCATION SIMPLE: LEFT LOWER BACK
LOCATION SIMPLE: LEFT THIGH
LOCATION SIMPLE: CHEST
LOCATION SIMPLE: ABDOMEN
LOCATION SIMPLE: LEFT CHEEK

## 2021-11-11 NOTE — PROCEDURE: ADDITIONAL NOTES
Detail Level: Simple
Render Risk Assessment In Note?: no
Additional Notes: Scar from previous mole removal per patient.

## 2023-01-11 ENCOUNTER — APPOINTMENT (RX ONLY)
Dept: URBAN - METROPOLITAN AREA CLINIC 4 | Facility: CLINIC | Age: 58
Setting detail: DERMATOLOGY
End: 2023-01-11

## 2023-01-11 DIAGNOSIS — L81.4 OTHER MELANIN HYPERPIGMENTATION: ICD-10-CM

## 2023-01-11 DIAGNOSIS — D22 MELANOCYTIC NEVI: ICD-10-CM

## 2023-01-11 DIAGNOSIS — L57.0 ACTINIC KERATOSIS: ICD-10-CM

## 2023-01-11 DIAGNOSIS — L82.1 OTHER SEBORRHEIC KERATOSIS: ICD-10-CM

## 2023-01-11 DIAGNOSIS — D18.0 HEMANGIOMA: ICD-10-CM

## 2023-01-11 DIAGNOSIS — L57.8 OTHER SKIN CHANGES DUE TO CHRONIC EXPOSURE TO NONIONIZING RADIATION: ICD-10-CM

## 2023-01-11 PROBLEM — D22.5 MELANOCYTIC NEVI OF TRUNK: Status: ACTIVE | Noted: 2023-01-11

## 2023-01-11 PROBLEM — D18.01 HEMANGIOMA OF SKIN AND SUBCUTANEOUS TISSUE: Status: ACTIVE | Noted: 2023-01-11

## 2023-01-11 PROCEDURE — ? COUNSELING

## 2023-01-11 PROCEDURE — 99213 OFFICE O/P EST LOW 20 MIN: CPT | Mod: 25

## 2023-01-11 PROCEDURE — 17000 DESTRUCT PREMALG LESION: CPT

## 2023-01-11 PROCEDURE — ? LIQUID NITROGEN

## 2023-01-11 ASSESSMENT — LOCATION SIMPLE DESCRIPTION DERM
LOCATION SIMPLE: RIGHT CHEEK
LOCATION SIMPLE: LEFT CHEEK
LOCATION SIMPLE: RIGHT UPPER ARM
LOCATION SIMPLE: LEFT FOREARM
LOCATION SIMPLE: RIGHT FOREARM
LOCATION SIMPLE: RIGHT THIGH
LOCATION SIMPLE: LEFT UPPER ARM
LOCATION SIMPLE: RIGHT UPPER BACK
LOCATION SIMPLE: LEFT THIGH
LOCATION SIMPLE: CHEST

## 2023-01-11 ASSESSMENT — LOCATION DETAILED DESCRIPTION DERM
LOCATION DETAILED: LEFT DISTAL DORSAL FOREARM
LOCATION DETAILED: RIGHT CENTRAL MALAR CHEEK
LOCATION DETAILED: RIGHT DISTAL DORSAL FOREARM
LOCATION DETAILED: RIGHT MID-UPPER BACK
LOCATION DETAILED: RIGHT ANTERIOR DISTAL THIGH
LOCATION DETAILED: LEFT INFERIOR CENTRAL MALAR CHEEK
LOCATION DETAILED: RIGHT SUPERIOR MEDIAL UPPER BACK
LOCATION DETAILED: LEFT ANTERIOR DISTAL THIGH
LOCATION DETAILED: LEFT ANTERIOR PROXIMAL UPPER ARM
LOCATION DETAILED: LEFT MEDIAL SUPERIOR CHEST
LOCATION DETAILED: RIGHT INFERIOR UPPER BACK
LOCATION DETAILED: RIGHT ANTERIOR PROXIMAL UPPER ARM

## 2023-01-11 ASSESSMENT — LOCATION ZONE DERM
LOCATION ZONE: TRUNK
LOCATION ZONE: FACE
LOCATION ZONE: LEG
LOCATION ZONE: ARM

## 2023-01-11 NOTE — PROCEDURE: LIQUID NITROGEN
Post-Care Instructions: I reviewed with the patient in detail post-care instructions. Patient is to wear sunprotection, and avoid picking at any of the treated lesions. Pt may apply Vaseline to crusted or scabbing areas.
Duration Of Freeze Thaw-Cycle (Seconds): 3
Render Post-Care Instructions In Note?: no
Number Of Freeze-Thaw Cycles: 2 freeze-thaw cycles
Detail Level: Simple
Show Aperture Variable?: Yes
Consent: The patient's consent was obtained including but not limited to risks of crusting, scabbing, blistering, scarring, darker or lighter pigmentary change, recurrence, incomplete removal and infection.
Aperture Size (Optional): C

## 2023-10-18 ENCOUNTER — APPOINTMENT (RX ONLY)
Dept: URBAN - METROPOLITAN AREA CLINIC 4 | Facility: CLINIC | Age: 58
Setting detail: DERMATOLOGY
End: 2023-10-18

## 2023-10-18 DIAGNOSIS — L81.4 OTHER MELANIN HYPERPIGMENTATION: ICD-10-CM

## 2023-10-18 PROCEDURE — 99212 OFFICE O/P EST SF 10 MIN: CPT

## 2023-10-18 PROCEDURE — ? COUNSELING

## 2023-10-18 ASSESSMENT — LOCATION SIMPLE DESCRIPTION DERM: LOCATION SIMPLE: LEFT FOREHEAD

## 2023-10-18 ASSESSMENT — LOCATION DETAILED DESCRIPTION DERM: LOCATION DETAILED: LEFT SUPERIOR FOREHEAD

## 2023-10-18 ASSESSMENT — LOCATION ZONE DERM: LOCATION ZONE: FACE

## 2024-10-17 ENCOUNTER — APPOINTMENT (RX ONLY)
Dept: URBAN - METROPOLITAN AREA CLINIC 4 | Facility: CLINIC | Age: 59
Setting detail: DERMATOLOGY
End: 2024-10-17

## 2024-10-17 DIAGNOSIS — L57.0 ACTINIC KERATOSIS: ICD-10-CM

## 2024-10-17 DIAGNOSIS — L81.4 OTHER MELANIN HYPERPIGMENTATION: ICD-10-CM

## 2024-10-17 DIAGNOSIS — L82.1 OTHER SEBORRHEIC KERATOSIS: ICD-10-CM

## 2024-10-17 DIAGNOSIS — L57.8 OTHER SKIN CHANGES DUE TO CHRONIC EXPOSURE TO NONIONIZING RADIATION: ICD-10-CM

## 2024-10-17 DIAGNOSIS — D18.0 HEMANGIOMA: ICD-10-CM

## 2024-10-17 DIAGNOSIS — D22 MELANOCYTIC NEVI: ICD-10-CM

## 2024-10-17 PROBLEM — D18.01 HEMANGIOMA OF SKIN AND SUBCUTANEOUS TISSUE: Status: ACTIVE | Noted: 2024-10-17

## 2024-10-17 PROBLEM — D22.5 MELANOCYTIC NEVI OF TRUNK: Status: ACTIVE | Noted: 2024-10-17

## 2024-10-17 PROCEDURE — 17000 DESTRUCT PREMALG LESION: CPT

## 2024-10-17 PROCEDURE — ? LIQUID NITROGEN

## 2024-10-17 PROCEDURE — ? COUNSELING

## 2024-10-17 PROCEDURE — 99213 OFFICE O/P EST LOW 20 MIN: CPT | Mod: 25

## 2024-10-17 ASSESSMENT — LOCATION ZONE DERM
LOCATION ZONE: NECK
LOCATION ZONE: LEG
LOCATION ZONE: ARM
LOCATION ZONE: TRUNK
LOCATION ZONE: FACE

## 2024-10-17 ASSESSMENT — LOCATION DETAILED DESCRIPTION DERM
LOCATION DETAILED: LEFT INFERIOR CENTRAL MALAR CHEEK
LOCATION DETAILED: LEFT ANTERIOR PROXIMAL UPPER ARM
LOCATION DETAILED: LEFT ANTERIOR DISTAL THIGH
LOCATION DETAILED: LEFT MEDIAL SUPERIOR CHEST
LOCATION DETAILED: LEFT SUPERIOR MEDIAL UPPER BACK
LOCATION DETAILED: RIGHT ANTERIOR PROXIMAL UPPER ARM
LOCATION DETAILED: RIGHT CENTRAL MALAR CHEEK
LOCATION DETAILED: RIGHT ANTERIOR DISTAL THIGH
LOCATION DETAILED: LEFT CLAVICULAR NECK
LOCATION DETAILED: LEFT DISTAL DORSAL FOREARM
LOCATION DETAILED: LEFT LATERAL ABDOMEN
LOCATION DETAILED: RIGHT DISTAL DORSAL FOREARM
LOCATION DETAILED: RIGHT INFERIOR MEDIAL UPPER BACK
LOCATION DETAILED: RIGHT MID-UPPER BACK

## 2024-10-17 ASSESSMENT — LOCATION SIMPLE DESCRIPTION DERM
LOCATION SIMPLE: RIGHT FOREARM
LOCATION SIMPLE: ABDOMEN
LOCATION SIMPLE: RIGHT UPPER ARM
LOCATION SIMPLE: LEFT THIGH
LOCATION SIMPLE: RIGHT THIGH
LOCATION SIMPLE: RIGHT UPPER BACK
LOCATION SIMPLE: LEFT UPPER BACK
LOCATION SIMPLE: LEFT ANTERIOR NECK
LOCATION SIMPLE: CHEST
LOCATION SIMPLE: LEFT UPPER ARM
LOCATION SIMPLE: LEFT FOREARM
LOCATION SIMPLE: LEFT CHEEK
LOCATION SIMPLE: RIGHT CHEEK

## 2024-10-17 NOTE — PROCEDURE: LIQUID NITROGEN
Consent: The patient's verbal consent was obtained including but not limited to risks of crusting, scabbing, blistering, scarring, darker or lighter pigmentary change, recurrence, incomplete removal and infection.
Application Tool (Optional): Cotton Tipped Applicator
Aperture Size (Optional): C
Show Applicator Variable?: Yes
Duration Of Freeze Thaw-Cycle (Seconds): 3
Number Of Freeze-Thaw Cycles: 2 freeze-thaw cycles
Render Post-Care Instructions In Note?: no
Post-Care Instructions: I reviewed with the patient in detail post-care instructions. Patient is to wear sunprotection, and avoid picking at any of the treated lesions. Pt may apply Vaseline to crusted or scabbing areas.
Detail Level: Simple